# Patient Record
Sex: MALE | Race: WHITE | Employment: OTHER | URBAN - METROPOLITAN AREA
[De-identification: names, ages, dates, MRNs, and addresses within clinical notes are randomized per-mention and may not be internally consistent; named-entity substitution may affect disease eponyms.]

---

## 2017-09-12 PROBLEM — R25.2 SPASTICITY: Status: ACTIVE | Noted: 2017-09-12

## 2017-09-27 PROCEDURE — 88305 TISSUE EXAM BY PATHOLOGIST: CPT | Performed by: INTERNAL MEDICINE

## 2017-09-28 PROBLEM — R26.9 NEUROLOGIC GAIT DYSFUNCTION: Status: ACTIVE | Noted: 2017-09-28

## 2017-11-28 PROBLEM — M25.641 JOINT STIFFNESS OF HAND, RIGHT: Status: ACTIVE | Noted: 2017-11-28

## 2017-11-28 PROBLEM — M25.612 LIMITATION OF JOINT MOTION OF LEFT SHOULDER: Status: ACTIVE | Noted: 2017-11-28

## 2017-12-15 PROCEDURE — 86803 HEPATITIS C AB TEST: CPT | Performed by: INTERNAL MEDICINE

## 2019-06-05 PROBLEM — K65.1 PERITONEAL ABSCESS (HCC): Status: ACTIVE | Noted: 2019-06-05

## 2019-06-05 PROBLEM — N17.9 ACUTE RENAL FAILURE (HCC): Status: ACTIVE | Noted: 2019-06-05

## 2019-06-05 PROBLEM — D63.1 ANEMIA IN CHRONIC KIDNEY DISEASE: Status: ACTIVE | Noted: 2019-06-05

## 2019-06-05 PROBLEM — N13.9: Status: ACTIVE | Noted: 2019-06-05

## 2019-06-05 PROBLEM — E87.5 HYPERPOTASSEMIA: Status: ACTIVE | Noted: 2019-06-05

## 2019-06-05 PROBLEM — R57.9 SHOCK (HCC): Status: ACTIVE | Noted: 2019-06-05

## 2019-06-05 PROBLEM — N18.9 ANEMIA IN CHRONIC KIDNEY DISEASE: Status: ACTIVE | Noted: 2019-06-05

## 2019-06-05 PROBLEM — E87.0 HYPEROSMOLALITY AND/OR HYPERNATREMIA: Status: ACTIVE | Noted: 2019-06-05

## 2019-06-05 PROBLEM — I10 MALIGNANT ESSENTIAL HYPERTENSION: Status: ACTIVE | Noted: 2019-06-05

## 2019-09-27 ENCOUNTER — HOSPITAL ENCOUNTER (OUTPATIENT)
Dept: INTERVENTIONAL RADIOLOGY/VASCULAR | Facility: HOSPITAL | Age: 64
Discharge: HOME | End: 2019-09-27
Attending: INTERNAL MEDICINE | Admitting: INTERNAL MEDICINE
Payer: COMMERCIAL

## 2019-09-27 VITALS
HEIGHT: 78 IN | HEART RATE: 55 BPM | SYSTOLIC BLOOD PRESSURE: 129 MMHG | BODY MASS INDEX: 33.55 KG/M2 | WEIGHT: 290 LBS | OXYGEN SATURATION: 98 % | TEMPERATURE: 98 F | DIASTOLIC BLOOD PRESSURE: 74 MMHG | RESPIRATION RATE: 16 BRPM

## 2019-09-27 DIAGNOSIS — R93.1 ABNORMAL NUCLEAR CARDIAC IMAGING TEST: ICD-10-CM

## 2019-09-27 DIAGNOSIS — I25.10 CAD (CORONARY ARTERY DISEASE): ICD-10-CM

## 2019-09-27 PROCEDURE — 4A023N8 MEASUREMENT OF CARDIAC SAMPLING AND PRESSURE, BILATERAL, PERCUTANEOUS APPROACH: ICD-10-PCS | Performed by: INTERNAL MEDICINE

## 2019-09-27 PROCEDURE — 99152 MOD SED SAME PHYS/QHP 5/>YRS: CPT

## 2019-09-27 PROCEDURE — 99153 MOD SED SAME PHYS/QHP EA: CPT

## 2019-09-27 PROCEDURE — B2151ZZ FLUOROSCOPY OF LEFT HEART USING LOW OSMOLAR CONTRAST: ICD-10-PCS | Performed by: INTERNAL MEDICINE

## 2019-09-27 PROCEDURE — 85027 COMPLETE CBC AUTOMATED: CPT | Performed by: INTERNAL MEDICINE

## 2019-09-27 PROCEDURE — 80048 BASIC METABOLIC PNL TOTAL CA: CPT | Performed by: INTERNAL MEDICINE

## 2019-09-27 PROCEDURE — 93460 R&L HRT ART/VENTRICLE ANGIO: CPT

## 2019-09-27 PROCEDURE — B5191ZZ FLUOROSCOPY OF INFERIOR VENA CAVA USING LOW OSMOLAR CONTRAST: ICD-10-PCS | Performed by: INTERNAL MEDICINE

## 2019-09-27 PROCEDURE — 75825 VEIN X-RAY TRUNK: CPT

## 2019-09-27 PROCEDURE — B2111ZZ FLUOROSCOPY OF MULTIPLE CORONARY ARTERIES USING LOW OSMOLAR CONTRAST: ICD-10-PCS | Performed by: INTERNAL MEDICINE

## 2019-09-27 PROCEDURE — 36415 COLL VENOUS BLD VENIPUNCTURE: CPT

## 2019-09-27 RX ORDER — LIDOCAINE HYDROCHLORIDE 10 MG/ML
INJECTION, SOLUTION EPIDURAL; INFILTRATION; INTRACAUDAL; PERINEURAL
Status: COMPLETED
Start: 2019-09-27 | End: 2019-09-27

## 2019-09-27 RX ORDER — SODIUM CHLORIDE 9 MG/ML
INJECTION, SOLUTION INTRAVENOUS
Status: COMPLETED | OUTPATIENT
Start: 2019-09-28 | End: 2019-09-27

## 2019-09-27 RX ORDER — ASPIRIN 81 MG/1
324 TABLET, CHEWABLE ORAL ONCE
Status: COMPLETED | OUTPATIENT
Start: 2019-09-27 | End: 2019-09-27

## 2019-09-27 RX ORDER — VERAPAMIL HYDROCHLORIDE 2.5 MG/ML
INJECTION, SOLUTION INTRAVENOUS
Status: COMPLETED
Start: 2019-09-27 | End: 2019-09-27

## 2019-09-27 RX ORDER — MIDAZOLAM HYDROCHLORIDE 1 MG/ML
INJECTION INTRAMUSCULAR; INTRAVENOUS
Status: COMPLETED
Start: 2019-09-27 | End: 2019-09-27

## 2019-09-27 RX ORDER — ASPIRIN 81 MG/1
TABLET, CHEWABLE ORAL
Status: COMPLETED
Start: 2019-09-27 | End: 2019-09-27

## 2019-09-27 RX ADMIN — SODIUM CHLORIDE: 9 INJECTION, SOLUTION INTRAVENOUS at 09:06:00

## 2019-09-27 RX ADMIN — ASPIRIN 324 MG: 81 TABLET, CHEWABLE ORAL at 08:45:00

## 2019-09-27 NOTE — PROGRESS NOTES
Cath: 1. IVC filter is occluded with essentially occluded IVC. 2. Coronaries with mild CAD; nothing close to obstructive. 3. Normal LV size and EF. 3. Normal filling pressures. 5. Normal cardiac output. Suggest pulmonary consult. PCP.   SOFIA with oniel

## 2019-09-27 NOTE — PROGRESS NOTES
Rc'd pt from cath lab in stable condition. VSS. Manual dressing to right brachial vein and Perclose to right femoral artery are soft, clean and dry. No bleeding or hematoma. Pt denies c/o pain or discomfort. Dr Facundo Noriega at bedside along with wife.  Pt to be on

## 2019-09-27 NOTE — PROCEDURES
[address]    PATIENT'S NAME: Rosales GIBSON   ATTENDING PHYSICIAN: Leora Foreman. Gigi Austin MD   OPERATING PHYSICIAN: Leora Foreman.  Gigi Austin MD   PATIENT ACCOUNT#:   [id number]    LOCATION:  [facility] 1 [facility] 10  MEDICAL RECORD #:   [id number]       DATE OF BIRTH: Janina right for the right coronary artery. We used a Janina right to perform left heart catheterization and left ventriculography. We measured LVEDP and the pullback gradient.   After doing that, we then gained right brachial venous access utilizing th due to his filter which was placed for DVT and PE approximately 4 years ago. At this point, pulmonology consultation and follow up with his primary care physician for further direction regarding his dyspnea may be reasonable.   We would like to see him in

## 2019-09-27 NOTE — H&P
Cards    See EMR. BRAVO. Abnormal stress.     Past Medical History:   Diagnosis Date   • BPH (benign prostatic hyperplasia)    • Central cord syndrome (HCC)    • Cervical compression fracture (HCC)    • Other and unspecified hyperlipidemia    • Unspecif

## 2019-10-23 PROBLEM — N13.9: Status: RESOLVED | Noted: 2019-06-05 | Resolved: 2019-10-23

## 2019-12-19 PROBLEM — N17.9 ACUTE RENAL FAILURE (HCC): Status: RESOLVED | Noted: 2019-06-05 | Resolved: 2019-12-19

## 2019-12-19 PROBLEM — R57.9 SHOCK (HCC): Status: RESOLVED | Noted: 2019-06-05 | Resolved: 2019-12-19

## 2019-12-19 PROBLEM — I10 MALIGNANT ESSENTIAL HYPERTENSION: Status: RESOLVED | Noted: 2019-06-05 | Resolved: 2019-12-19

## 2020-01-20 NOTE — H&P (VIEW-ONLY)
Sonia Calzada is a 71year old male. Patient presents with:  Consult: Ref by Dr. Barbara Brown; mass of the appendix       Doc Lied Dy    HPI:  Patient presents after undergoing a CT scan of his abdomen and pelvis and being found to have a mass on his appendix.  This Yes      Alcohol/week: 2.5 standard drinks      Types: 3 Cans of beer per week      Comment: occasional beer    Drug use: No      Allergies:    Heparin                     Comment:Other reaction(s):  Other (See Comments)             Bad reaction (retroperit S2 normal, RRR; no S3, no S4; no click; murmur negative  ABDOMEN: normal active BS+, soft, nondistended; no HSM; no masses; no bruits; nontender  RECTAL: deferred  LYMPHATIC: no lymphadenopathy  MUSCULOSKELETAL: no acute synovitis upper or lower extremity,

## 2020-01-27 ENCOUNTER — APPOINTMENT (OUTPATIENT)
Dept: LAB | Age: 65
End: 2020-01-27
Payer: MEDICARE

## 2020-01-27 DIAGNOSIS — K38.8 MASS OF APPENDIX: ICD-10-CM

## 2020-01-27 DIAGNOSIS — I10 ESSENTIAL HYPERTENSION: ICD-10-CM

## 2020-01-27 DIAGNOSIS — N18.9 ANEMIA IN CHRONIC KIDNEY DISEASE: ICD-10-CM

## 2020-01-27 DIAGNOSIS — D63.1 ANEMIA IN CHRONIC KIDNEY DISEASE: ICD-10-CM

## 2020-01-27 LAB
ATRIAL RATE: 63 BPM
DEPRECATED RDW RBC AUTO: 41.6 FL (ref 35.1–46.3)
ERYTHROCYTE [DISTWIDTH] IN BLOOD BY AUTOMATED COUNT: 11.7 % (ref 11–15)
HCT VFR BLD AUTO: 52.9 % (ref 39–53)
HGB BLD-MCNC: 16.9 G/DL (ref 13–17.5)
MCH RBC QN AUTO: 30.8 PG (ref 26–34)
MCHC RBC AUTO-ENTMCNC: 31.9 G/DL (ref 31–37)
MCV RBC AUTO: 96.5 FL (ref 80–100)
P AXIS: 21 DEGREES
P-R INTERVAL: 206 MS
PLATELET # BLD AUTO: 177 10(3)UL (ref 150–450)
Q-T INTERVAL: 428 MS
QRS DURATION: 90 MS
QTC CALCULATION (BEZET): 437 MS
R AXIS: -18 DEGREES
RBC # BLD AUTO: 5.48 X10(6)UL (ref 3.8–5.8)
T AXIS: 53 DEGREES
VENTRICULAR RATE: 63 BPM
WBC # BLD AUTO: 7.8 X10(3) UL (ref 4–11)

## 2020-01-27 PROCEDURE — 85027 COMPLETE CBC AUTOMATED: CPT

## 2020-01-27 PROCEDURE — 36415 COLL VENOUS BLD VENIPUNCTURE: CPT

## 2020-01-27 PROCEDURE — 93010 ELECTROCARDIOGRAM REPORT: CPT | Performed by: INTERNAL MEDICINE

## 2020-01-27 PROCEDURE — 93005 ELECTROCARDIOGRAM TRACING: CPT

## 2020-02-05 ENCOUNTER — ANESTHESIA EVENT (OUTPATIENT)
Dept: SURGERY | Facility: HOSPITAL | Age: 65
End: 2020-02-05
Payer: MEDICARE

## 2020-02-05 ENCOUNTER — ANESTHESIA (OUTPATIENT)
Dept: SURGERY | Facility: HOSPITAL | Age: 65
End: 2020-02-05
Payer: MEDICARE

## 2020-02-05 ENCOUNTER — HOSPITAL ENCOUNTER (OUTPATIENT)
Facility: HOSPITAL | Age: 65
Setting detail: HOSPITAL OUTPATIENT SURGERY
Discharge: HOME | End: 2020-02-05
Attending: SURGERY | Admitting: SURGERY
Payer: MEDICARE

## 2020-02-05 VITALS
HEART RATE: 56 BPM | RESPIRATION RATE: 18 BRPM | OXYGEN SATURATION: 98 % | HEIGHT: 78 IN | BODY MASS INDEX: 34.18 KG/M2 | DIASTOLIC BLOOD PRESSURE: 62 MMHG | TEMPERATURE: 98 F | SYSTOLIC BLOOD PRESSURE: 143 MMHG | WEIGHT: 295.44 LBS

## 2020-02-05 DIAGNOSIS — K38.8 MASS OF APPENDIX: ICD-10-CM

## 2020-02-05 DIAGNOSIS — D63.1 ANEMIA IN CHRONIC KIDNEY DISEASE: ICD-10-CM

## 2020-02-05 DIAGNOSIS — N18.9 ANEMIA IN CHRONIC KIDNEY DISEASE: ICD-10-CM

## 2020-02-05 DIAGNOSIS — I10 ESSENTIAL HYPERTENSION: Primary | ICD-10-CM

## 2020-02-05 PROCEDURE — 0DTJ4ZZ RESECTION OF APPENDIX, PERCUTANEOUS ENDOSCOPIC APPROACH: ICD-10-PCS | Performed by: SURGERY

## 2020-02-05 PROCEDURE — 0WQF0ZZ REPAIR ABDOMINAL WALL, OPEN APPROACH: ICD-10-PCS | Performed by: SURGERY

## 2020-02-05 PROCEDURE — 88304 TISSUE EXAM BY PATHOLOGIST: CPT | Performed by: SURGERY

## 2020-02-05 RX ORDER — KETOROLAC TROMETHAMINE 30 MG/ML
INJECTION, SOLUTION INTRAMUSCULAR; INTRAVENOUS AS NEEDED
Status: DISCONTINUED | OUTPATIENT
Start: 2020-02-05 | End: 2020-02-05 | Stop reason: SURG

## 2020-02-05 RX ORDER — DEXAMETHASONE SODIUM PHOSPHATE 4 MG/ML
VIAL (ML) INJECTION AS NEEDED
Status: DISCONTINUED | OUTPATIENT
Start: 2020-02-05 | End: 2020-02-05 | Stop reason: SURG

## 2020-02-05 RX ORDER — SODIUM CHLORIDE, SODIUM LACTATE, POTASSIUM CHLORIDE, CALCIUM CHLORIDE 600; 310; 30; 20 MG/100ML; MG/100ML; MG/100ML; MG/100ML
INJECTION, SOLUTION INTRAVENOUS CONTINUOUS
Status: DISCONTINUED | OUTPATIENT
Start: 2020-02-05 | End: 2020-02-05

## 2020-02-05 RX ORDER — LIDOCAINE HYDROCHLORIDE 10 MG/ML
INJECTION, SOLUTION INFILTRATION; PERINEURAL AS NEEDED
Status: DISCONTINUED | OUTPATIENT
Start: 2020-02-05 | End: 2020-02-05 | Stop reason: HOSPADM

## 2020-02-05 RX ORDER — METOCLOPRAMIDE HYDROCHLORIDE 5 MG/ML
10 INJECTION INTRAMUSCULAR; INTRAVENOUS AS NEEDED
Status: DISCONTINUED | OUTPATIENT
Start: 2020-02-05 | End: 2020-02-05

## 2020-02-05 RX ORDER — BUPIVACAINE HYDROCHLORIDE 5 MG/ML
INJECTION, SOLUTION EPIDURAL; INTRACAUDAL AS NEEDED
Status: DISCONTINUED | OUTPATIENT
Start: 2020-02-05 | End: 2020-02-05 | Stop reason: HOSPADM

## 2020-02-05 RX ORDER — HYDROCODONE BITARTRATE AND ACETAMINOPHEN 10; 325 MG/1; MG/1
2 TABLET ORAL AS NEEDED
Status: COMPLETED | OUTPATIENT
Start: 2020-02-05 | End: 2020-02-05

## 2020-02-05 RX ORDER — NALOXONE HYDROCHLORIDE 0.4 MG/ML
80 INJECTION, SOLUTION INTRAMUSCULAR; INTRAVENOUS; SUBCUTANEOUS AS NEEDED
Status: DISCONTINUED | OUTPATIENT
Start: 2020-02-05 | End: 2020-02-05

## 2020-02-05 RX ORDER — HYDROMORPHONE HYDROCHLORIDE 1 MG/ML
INJECTION, SOLUTION INTRAMUSCULAR; INTRAVENOUS; SUBCUTANEOUS
Status: COMPLETED
Start: 2020-02-05 | End: 2020-02-05

## 2020-02-05 RX ORDER — ONDANSETRON 2 MG/ML
INJECTION INTRAMUSCULAR; INTRAVENOUS AS NEEDED
Status: DISCONTINUED | OUTPATIENT
Start: 2020-02-05 | End: 2020-02-05 | Stop reason: SURG

## 2020-02-05 RX ORDER — PHENYLEPHRINE HCL 10 MG/ML
VIAL (ML) INJECTION AS NEEDED
Status: DISCONTINUED | OUTPATIENT
Start: 2020-02-05 | End: 2020-02-05 | Stop reason: SURG

## 2020-02-05 RX ORDER — ACETAMINOPHEN 500 MG
1000 TABLET ORAL ONCE AS NEEDED
Status: DISCONTINUED | OUTPATIENT
Start: 2020-02-05 | End: 2020-02-05

## 2020-02-05 RX ORDER — GLYCOPYRROLATE 0.2 MG/ML
INJECTION, SOLUTION INTRAMUSCULAR; INTRAVENOUS AS NEEDED
Status: DISCONTINUED | OUTPATIENT
Start: 2020-02-05 | End: 2020-02-05 | Stop reason: SURG

## 2020-02-05 RX ORDER — DEXAMETHASONE SODIUM PHOSPHATE 4 MG/ML
4 VIAL (ML) INJECTION AS NEEDED
Status: DISCONTINUED | OUTPATIENT
Start: 2020-02-05 | End: 2020-02-05

## 2020-02-05 RX ORDER — ACETAMINOPHEN 500 MG
1000 TABLET ORAL ONCE
Status: DISCONTINUED | OUTPATIENT
Start: 2020-02-05 | End: 2020-02-05 | Stop reason: HOSPADM

## 2020-02-05 RX ORDER — ONDANSETRON 2 MG/ML
4 INJECTION INTRAMUSCULAR; INTRAVENOUS AS NEEDED
Status: DISCONTINUED | OUTPATIENT
Start: 2020-02-05 | End: 2020-02-05

## 2020-02-05 RX ORDER — ROCURONIUM BROMIDE 10 MG/ML
INJECTION, SOLUTION INTRAVENOUS AS NEEDED
Status: DISCONTINUED | OUTPATIENT
Start: 2020-02-05 | End: 2020-02-05 | Stop reason: SURG

## 2020-02-05 RX ORDER — LABETALOL HYDROCHLORIDE 5 MG/ML
5 INJECTION, SOLUTION INTRAVENOUS EVERY 5 MIN PRN
Status: DISCONTINUED | OUTPATIENT
Start: 2020-02-05 | End: 2020-02-05

## 2020-02-05 RX ORDER — HYDROCODONE BITARTRATE AND ACETAMINOPHEN 10; 325 MG/1; MG/1
1 TABLET ORAL AS NEEDED
Status: COMPLETED | OUTPATIENT
Start: 2020-02-05 | End: 2020-02-05

## 2020-02-05 RX ORDER — DIPHENHYDRAMINE HYDROCHLORIDE 50 MG/ML
12.5 INJECTION INTRAMUSCULAR; INTRAVENOUS AS NEEDED
Status: DISCONTINUED | OUTPATIENT
Start: 2020-02-05 | End: 2020-02-05

## 2020-02-05 RX ORDER — MIDAZOLAM HYDROCHLORIDE 1 MG/ML
1 INJECTION INTRAMUSCULAR; INTRAVENOUS EVERY 5 MIN PRN
Status: DISCONTINUED | OUTPATIENT
Start: 2020-02-05 | End: 2020-02-05

## 2020-02-05 RX ORDER — NEOSTIGMINE METHYLSULFATE 1 MG/ML
INJECTION INTRAVENOUS AS NEEDED
Status: DISCONTINUED | OUTPATIENT
Start: 2020-02-05 | End: 2020-02-05 | Stop reason: SURG

## 2020-02-05 RX ORDER — HYDROCODONE BITARTRATE AND ACETAMINOPHEN 5; 325 MG/1; MG/1
1-2 TABLET ORAL
Qty: 24 TABLET | Refills: 0 | Status: SHIPPED | OUTPATIENT
Start: 2020-02-05 | End: 2020-02-15

## 2020-02-05 RX ORDER — HYDROMORPHONE HYDROCHLORIDE 1 MG/ML
0.4 INJECTION, SOLUTION INTRAMUSCULAR; INTRAVENOUS; SUBCUTANEOUS EVERY 5 MIN PRN
Status: DISCONTINUED | OUTPATIENT
Start: 2020-02-05 | End: 2020-02-05

## 2020-02-05 RX ORDER — MEPERIDINE HYDROCHLORIDE 25 MG/ML
12.5 INJECTION INTRAMUSCULAR; INTRAVENOUS; SUBCUTANEOUS AS NEEDED
Status: DISCONTINUED | OUTPATIENT
Start: 2020-02-05 | End: 2020-02-05

## 2020-02-05 RX ADMIN — ONDANSETRON 4 MG: 2 INJECTION INTRAMUSCULAR; INTRAVENOUS at 16:50:00

## 2020-02-05 RX ADMIN — PHENYLEPHRINE HCL 100 MCG: 10 MG/ML VIAL (ML) INJECTION at 16:23:00

## 2020-02-05 RX ADMIN — GLYCOPYRROLATE 1 MG: 0.2 INJECTION, SOLUTION INTRAMUSCULAR; INTRAVENOUS at 17:01:00

## 2020-02-05 RX ADMIN — SODIUM CHLORIDE, SODIUM LACTATE, POTASSIUM CHLORIDE, CALCIUM CHLORIDE: 600; 310; 30; 20 INJECTION, SOLUTION INTRAVENOUS at 16:01:00

## 2020-02-05 RX ADMIN — DEXAMETHASONE SODIUM PHOSPHATE 4 MG: 4 MG/ML VIAL (ML) INJECTION at 16:35:00

## 2020-02-05 RX ADMIN — KETOROLAC TROMETHAMINE 30 MG: 30 INJECTION, SOLUTION INTRAMUSCULAR; INTRAVENOUS at 16:57:00

## 2020-02-05 RX ADMIN — NEOSTIGMINE METHYLSULFATE 5 MG: 1 INJECTION INTRAVENOUS at 17:02:00

## 2020-02-05 RX ADMIN — ROCURONIUM BROMIDE 70 MG: 10 INJECTION, SOLUTION INTRAVENOUS at 16:08:00

## 2020-02-05 RX ADMIN — PHENYLEPHRINE HCL 100 MCG: 10 MG/ML VIAL (ML) INJECTION at 16:29:00

## 2020-02-05 NOTE — ANESTHESIA POSTPROCEDURE EVALUATION
[address] [facility] Patient Status:  Hospital Outpatient Surgery   Age/Gender 71year old male MRN [id number]   [facility] SURGERY Attending Sharon Wilcox MD   Hosp Day # 0 PCP Lupe Rueda MD       Anesthesia Post-op Note    P

## 2020-02-05 NOTE — INTERVAL H&P NOTE
Pre-op Diagnosis: Mass of appendix [K38.8]    The above referenced H&P was reviewed by Mickey Medina MD on 2/5/2020, the patient was examined and no significant changes have occurred in the patient's condition since the H&P was performed.   I discussed wit

## 2020-02-05 NOTE — ANESTHESIA PREPROCEDURE EVALUATION
PRE-OP EVALUATION    Patient Name: Arnulfo Morillo    Pre-op Diagnosis: Mass of appendix [K38.8]    Procedure(s):  LAPAROSCOPIC APPENDECTOMY, POSSIBLE OPEN; UMBILICAL HERNIA REPAIR      Surgeon(s) and Role:     Stephen Quiroga MD - Primary    Pre-op sonja Cardiovascular        Exercise tolerance: good     MET: >4    (+) obesity  (+) hypertension                                     Endo/Other    Negative endo/other ROS. Pulmonary    Negative pulmonary ROS. Airway      Mallampati: II  Mouth opening: 3 FB  TM distance: 4 - 6 cm  Neck ROM: limited Cardiovascular    Cardiovascular exam normal.         Dental    No notable dental history.          Pulmonary    Pulmonary exam normal.                 Other fin

## 2020-02-05 NOTE — INTERVAL H&P NOTE
Pre-op Diagnosis: Mass of appendix [K38.8]    The above referenced H&P was reviewed by Cleo Dumont MD on 2/5/2020, the patient was examined and no significant changes have occurred in the patient's condition since the H&P was performed.   I discussed wit

## 2020-02-05 NOTE — ANESTHESIA PROCEDURE NOTES
Airway  Date/Time: 2/5/2020 4:10 PM  Urgency: elective    Airway not difficult    General Information and Staff    Patient location during procedure: OR  Anesthesiologist: Daniel Mcelroy MD  Performed: anesthesiologist     Indications and Patient Con

## 2020-02-05 NOTE — BRIEF OP NOTE
Pre-Operative Diagnosis: Mass of appendix [K38.8]     Post-Operative Diagnosis: Mass of appendix [K38.8]      Procedure Performed:   Procedure(s):  LAPAROSCOPIC APPENDECTOMY UMBILICAL HERNIA REPAIR      Surgeon(s) and Role:     MD Sharmaine Messer

## 2020-02-06 NOTE — OPERATIVE REPORT
[facility]                                                         Operative Note    Weston Null Location: [place] [address] Perioperative   CSN [id number] MRN [id number]   Admission Date 2/5/2020 Procedure Date 2/6/2020   Attending Physician No att. providers fo abdominal cavity. The base of the appendix appeared to be of normal diameter. Hemostasis achieved. The abdomen was irrigated. Trochars were removed. Fascia the umbilicus was closed with interrupted 0 Ethibond sutures.   Wounds were closed absorbable julien

## 2020-02-19 PROBLEM — D37.3 LOW GRADE MUCINOUS NEOPLASM OF APPENDIX: Status: ACTIVE | Noted: 2020-02-19

## 2020-02-19 PROBLEM — D37.3 APPENDICEAL TUMOR: Status: ACTIVE | Noted: 2020-02-19

## 2020-02-19 PROBLEM — F41.8 ANXIETY ABOUT HEALTH: Status: ACTIVE | Noted: 2020-02-19

## 2020-07-10 PROCEDURE — 88305 TISSUE EXAM BY PATHOLOGIST: CPT | Performed by: INTERNAL MEDICINE

## 2021-04-29 ENCOUNTER — APPOINTMENT (OUTPATIENT)
Dept: GENERAL RADIOLOGY | Facility: HOSPITAL | Age: 66
DRG: 390 | End: 2021-04-29
Attending: SURGERY
Payer: MEDICARE

## 2021-04-29 ENCOUNTER — HOSPITAL ENCOUNTER (INPATIENT)
Facility: HOSPITAL | Age: 66
LOS: 3 days | Discharge: HOME | DRG: 390 | End: 2021-05-02
Attending: HOSPITALIST | Admitting: HOSPITALIST
Payer: MEDICARE

## 2021-04-29 PROCEDURE — C9113 INJ PANTOPRAZOLE SODIUM, VIA: HCPCS | Performed by: HOSPITALIST

## 2021-04-29 PROCEDURE — 71045 X-RAY EXAM CHEST 1 VIEW: CPT | Performed by: SURGERY

## 2021-04-29 RX ORDER — SODIUM CHLORIDE 9 MG/ML
INJECTION, SOLUTION INTRAVENOUS CONTINUOUS
Status: DISCONTINUED | OUTPATIENT
Start: 2021-04-29 | End: 2021-05-02

## 2021-04-29 RX ORDER — MORPHINE SULFATE 2 MG/ML
1 INJECTION, SOLUTION INTRAMUSCULAR; INTRAVENOUS EVERY 2 HOUR PRN
Status: DISCONTINUED | OUTPATIENT
Start: 2021-04-29 | End: 2021-05-02

## 2021-04-29 RX ORDER — HYDRALAZINE HYDROCHLORIDE 20 MG/ML
10 INJECTION INTRAMUSCULAR; INTRAVENOUS EVERY 6 HOURS PRN
Status: DISCONTINUED | OUTPATIENT
Start: 2021-04-29 | End: 2021-05-02

## 2021-04-29 RX ORDER — SALIVA STIMULANT COMB. NO.3
SPRAY, NON-AEROSOL (ML) MUCOUS MEMBRANE AS NEEDED
Status: DISCONTINUED | OUTPATIENT
Start: 2021-04-29 | End: 2021-05-02

## 2021-04-29 RX ORDER — MORPHINE SULFATE 2 MG/ML
2 INJECTION, SOLUTION INTRAMUSCULAR; INTRAVENOUS EVERY 2 HOUR PRN
Status: DISCONTINUED | OUTPATIENT
Start: 2021-04-29 | End: 2021-05-02

## 2021-04-29 RX ORDER — LORAZEPAM 2 MG/ML
0.5 INJECTION INTRAMUSCULAR ONCE
Status: COMPLETED | OUTPATIENT
Start: 2021-04-29 | End: 2021-04-29

## 2021-04-29 RX ORDER — HYDRALAZINE HYDROCHLORIDE 20 MG/ML
10 INJECTION INTRAMUSCULAR; INTRAVENOUS ONCE
Status: DISCONTINUED | OUTPATIENT
Start: 2021-04-29 | End: 2021-04-29

## 2021-04-29 RX ORDER — ONDANSETRON 2 MG/ML
4 INJECTION INTRAMUSCULAR; INTRAVENOUS EVERY 6 HOURS PRN
Status: DISCONTINUED | OUTPATIENT
Start: 2021-04-29 | End: 2021-05-02

## 2021-04-29 RX ORDER — MORPHINE SULFATE 4 MG/ML
4 INJECTION, SOLUTION INTRAMUSCULAR; INTRAVENOUS EVERY 2 HOUR PRN
Status: DISCONTINUED | OUTPATIENT
Start: 2021-04-29 | End: 2021-05-02

## 2021-04-30 PROCEDURE — C9113 INJ PANTOPRAZOLE SODIUM, VIA: HCPCS | Performed by: HOSPITALIST

## 2021-04-30 RX ORDER — FLUTICASONE PROPIONATE 50 MCG
1 SPRAY, SUSPENSION (ML) NASAL DAILY
Status: DISCONTINUED | OUTPATIENT
Start: 2021-04-30 | End: 2021-05-02

## 2021-04-30 RX ORDER — KETOROLAC TROMETHAMINE 15 MG/ML
15 INJECTION, SOLUTION INTRAMUSCULAR; INTRAVENOUS EVERY 6 HOURS PRN
Status: DISPENSED | OUTPATIENT
Start: 2021-04-30 | End: 2021-05-02

## 2021-04-30 RX ORDER — TAMSULOSIN HYDROCHLORIDE 0.4 MG/1
0.8 CAPSULE ORAL EVERY EVENING
Status: DISCONTINUED | OUTPATIENT
Start: 2021-04-30 | End: 2021-05-02

## 2021-04-30 RX ORDER — ACETAMINOPHEN 10 MG/ML
1000 INJECTION, SOLUTION INTRAVENOUS EVERY 6 HOURS PRN
Status: DISCONTINUED | OUTPATIENT
Start: 2021-04-30 | End: 2021-05-02

## 2021-04-30 RX ORDER — METOPROLOL TARTRATE 5 MG/5ML
5 INJECTION INTRAVENOUS EVERY 6 HOURS
Status: DISCONTINUED | OUTPATIENT
Start: 2021-04-30 | End: 2021-05-02

## 2021-04-30 NOTE — PROGRESS NOTES
[facility] Pharmacy Note:  Age Based Dose Adjustment    Rigo Henao has been prescribed ketorolac (TORADOL) 30 mg IV every 6 hours pen pain. Patient is >71 years old therefore the dose has been adjusted to 15 mg IV every 6 hours prn pain.       Thank you,  Ricardo Mott

## 2021-04-30 NOTE — PLAN OF CARE
A&Ox4. VSS. RA. . GI: Abdomen soft, nondistended, rounded. Denies passing gas. Denies nausea. : DTV. Pain controlled with PRN pain medications  Up with standby assist.  Drains: NGT in place to LSI draining bile colored drainage.  Cxray confirmed pl Consider OT/PT consult to assist with strengthening/mobility  - Encourage toileting schedule  Outcome: Progressing     Problem: DISCHARGE PLANNING  Goal: Discharge to home or other facility with appropriate resources  Description: INTERVENTIONS:  - Identif

## 2021-04-30 NOTE — CONSULTS
[facility]  Report of Consultation    Yasir Hale Patient Status:  Inpatient    10/7/1955 MRN [id number]   [facility] 3NW-A Attending Miki Martin MD   [address] Day # 1 PCP Adina Delgado MD     21    Reason for Consultation:    [place] gastric bx (-) hpylori       Family History:    Family History   Problem Relation Age of Onset   • Heart Attack Father    • Hypertension Mother    • Diabetes Mother    • Hypertension Brother        Social History:     reports that he has never smoked.  He h daily., Disp: 90 tablet, Rfl: 2  tamsulosin (FLOMAX) cap, Take 2 capsules (0.8 mg total) by mouth every evening., Disp: 180 capsule, Rfl: 2  Metoprolol Succinate ER 25 MG Oral Tablet 24 Hr, Take 1 tablet (25 mg total) by mouth 2 (two) times daily. , Disp: 1 pelvis 6/10/2020. TECHNIQUE:  Axial images of the abdomen and pelvis were performed from the lung bases through the pubic symphysis after the injection of nonionic intravenous contrast.  Oral contrast was not used for the examination.   80 mL of Isovue 370 radiograph was obtained.   COMPARISON:  [facility] , XR, XR CHEST AP PORTABLE  (CPT=71010), 8/11/2015, 2:38 PM.  INDICATIONS:  Verify correct tube placement  PATIENT STATED HISTORY: (As transcribed by Technologist)  Patient offered no additional history at this

## 2021-04-30 NOTE — PROGRESS NOTES
NURSING ADMISSION NOTE      Patient admitted via Wheelchair  Oriented to room. Safety precautions initiated. Bed in low position. Call light in reach. A&O x4. Denies any CP, BRENDAN, or calf pain at present. VSS and afebrile. Lungs clear bilaterally.  A

## 2021-04-30 NOTE — PLAN OF CARE
A&O x4. Denies any CP, BRENDAN, or calf pain at present. Lungs clear bilaterally. Abdomen soft, tender, rounded. Bowel sounds hypoactive - pt reports passing gas. Denies any nausea at this time. NPO. Voiding without difficulty.  Pt reports headache but declines reduce risk of injury  - Provide assistive devices as appropriate  - Consider OT/PT consult to assist with strengthening/mobility  - Encourage toileting schedule  Outcome: Progressing     Problem: DISCHARGE PLANNING  Goal: Discharge to home or other facili

## 2021-04-30 NOTE — H&P
[facility] Hospitalist H&P       CC: No chief complaint on file.        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 , Disp: 90 tablet, Rfl: 2  tamsulosin (FLOMAX) cap, Take 2 capsules (0.8 mg total) by mouth every evening., Disp: 180 capsule, Rfl: 2  Metoprolol Succinate ER 25 MG Oral Tablet 24 Hr, Take 1 tablet (25 mg tot rashes/lesions  Psych: normal mood/affect      Diagnostic Data:    CBC/Chem  Recent Labs   Lab 04/29/21  1345   WBC 11.52   HGB 18.0*   MCV 93.2      NE 9.81*   LYMABS 0.77*       Recent Labs   Lab 04/29/21  1345      K 4.43      CO2 22. concerning for small bowel obstruction with transition in the distal bowel loops in the right side of the abdomen. [place] PERITONEUM: No free fluid. ABDOMINAL WALL: There are small bilateral inguinal hernias containing fat.  OSSEOUS STRUCTURES: There are degenera of being without chemical DVT prophy (DVT, PE). Pt aware of risks and has decided to just use SCDs and ambulation at this time. Prophy: SCDs only at this time.  Readdress if pt will be NPO for prolonged period of time due to h/o DVT    Dispo: admit    L

## 2021-05-01 ENCOUNTER — APPOINTMENT (OUTPATIENT)
Dept: GENERAL RADIOLOGY | Facility: HOSPITAL | Age: 66
DRG: 390 | End: 2021-05-01
Attending: PHYSICIAN ASSISTANT
Payer: MEDICARE

## 2021-05-01 PROCEDURE — 81003 URINALYSIS AUTO W/O SCOPE: CPT | Performed by: HOSPITALIST

## 2021-05-01 PROCEDURE — C9113 INJ PANTOPRAZOLE SODIUM, VIA: HCPCS | Performed by: HOSPITALIST

## 2021-05-01 PROCEDURE — 80048 BASIC METABOLIC PNL TOTAL CA: CPT | Performed by: HOSPITALIST

## 2021-05-01 PROCEDURE — 74019 RADEX ABDOMEN 2 VIEWS: CPT | Performed by: PHYSICIAN ASSISTANT

## 2021-05-01 PROCEDURE — 85025 COMPLETE CBC W/AUTO DIFF WBC: CPT | Performed by: HOSPITALIST

## 2021-05-01 RX ORDER — SODIUM CHLORIDE 9 MG/ML
INJECTION, SOLUTION INTRAVENOUS CONTINUOUS
Status: ACTIVE | OUTPATIENT
Start: 2021-05-01 | End: 2021-05-01

## 2021-05-01 NOTE — PLAN OF CARE
A&Ox4. VSS. RA. . GI: Abdomen soft, nondistended, rounded. Denies passing gas. Denies nausea. : Voids. Low urine output. Urine very dark. 500ml bolus x2. Bladder scan after 2nd bolus and after voiding. <150mL on both bladder scans.   Pain well contr based on assessment  - Modify environment to reduce risk of injury  - Provide assistive devices as appropriate  - Consider OT/PT consult to assist with strengthening/mobility  - Encourage toileting schedule  Outcome: Progressing     Problem: DISCHARGE PLAN

## 2021-05-01 NOTE — PROGRESS NOTES
[facility]  Progress Note    Richard Duenas Patient Status:  Inpatient    10/7/1955 MRN [id number]   [facility] 3NW-A Attending Bhargav Eaton MD   Hosp Day # 2 PCP Haroon Machado MD     Subjective:  Patient is sitting up without

## 2021-05-01 NOTE — PROGRESS NOTES
[facility]  Progress Note    [address] [facility] Patient Status:  Inpatient    10/7/1955 MRN [id number]   [facility] 3NW-A Attending Zari Nur MD   Hosp Day # 2 PCP Dimple Bonilla MD     Subjective:    Denies abdominal pain.   Pass as above     Dispo: admit

## 2021-05-01 NOTE — PLAN OF CARE
Denies any pain. Denies nausea. Feels like stomach is \"gurgling\" and he is passing gas. NG clamped at 0930 per general surgery. Started on clear liquid diet. Will discontinue NG tube if tolerates for 6 hours. Going down for obstructive series also.  Alyssa Beatty

## 2021-05-02 VITALS
SYSTOLIC BLOOD PRESSURE: 136 MMHG | HEART RATE: 75 BPM | DIASTOLIC BLOOD PRESSURE: 70 MMHG | BODY MASS INDEX: 34 KG/M2 | OXYGEN SATURATION: 100 % | RESPIRATION RATE: 18 BRPM | TEMPERATURE: 98 F | WEIGHT: 295 LBS

## 2021-05-02 PROCEDURE — C9113 INJ PANTOPRAZOLE SODIUM, VIA: HCPCS | Performed by: HOSPITALIST

## 2021-05-02 PROCEDURE — 84132 ASSAY OF SERUM POTASSIUM: CPT | Performed by: HOSPITALIST

## 2021-05-02 RX ORDER — PANTOPRAZOLE SODIUM 40 MG/1
40 TABLET, DELAYED RELEASE ORAL
Status: DISCONTINUED | OUTPATIENT
Start: 2021-05-02 | End: 2021-05-02

## 2021-05-02 RX ORDER — FUROSEMIDE 20 MG/1
20 TABLET ORAL DAILY
Status: DISCONTINUED | OUTPATIENT
Start: 2021-05-02 | End: 2021-05-02

## 2021-05-02 RX ORDER — ASPIRIN 81 MG/1
81 TABLET ORAL DAILY
Status: DISCONTINUED | OUTPATIENT
Start: 2021-05-02 | End: 2021-05-02

## 2021-05-02 RX ORDER — METOPROLOL SUCCINATE 25 MG/1
25 TABLET, EXTENDED RELEASE ORAL
Status: DISCONTINUED | OUTPATIENT
Start: 2021-05-02 | End: 2021-05-02

## 2021-05-02 RX ORDER — POTASSIUM CHLORIDE 20 MEQ/1
40 TABLET, EXTENDED RELEASE ORAL ONCE
Status: COMPLETED | OUTPATIENT
Start: 2021-05-02 | End: 2021-05-02

## 2021-05-02 NOTE — PLAN OF CARE
Pt states he feels much better tonight, appetite is increasing, tolerated clear liquids for lunch and dinner, is passing flatus, no nausea, no abdominal pain. Pt urine output is improving, is back on flomax. Denies any difficulty urinating.  Pt has history INTERVENTIONS:  - Assess pt frequently for physical needs  - Identify cognitive and physical deficits and behaviors that affect risk of falls.   - [place] fall precautions as indicated by assessment.  - Educate pt/family on patient safety including physic

## 2021-05-02 NOTE — PROGRESS NOTES
The patient is tolerating clear liquids. He denies any nausea and states that he is passing flatus. No residual gastric fluid obtained from nasogastric tube after completion of clamping trial. Nasogastric tube removed per MD order.

## 2021-05-02 NOTE — PLAN OF CARE
Patient ambulating halls. VSS. Tolerated breakfast with no abdominal pain/nausea. Will order lunch. Abdomen rounded, BS+, passing flatus. POC discussed with patient, all questions and concerns addressed. Will continue to monitor.

## 2021-05-02 NOTE — PLAN OF CARE
Patient tolerated lunch. Had small BM. Ready for discharge. Patients IV d/c'd, catheter intact. All discharge instructions explained, all questions answered. Patient declined wheelchair, will ambulate to elevator with spouse.

## 2021-05-02 NOTE — PROGRESS NOTES
[facility]  Progress Note    Tiff Duenas Patient Status:  Inpatient    10/7/1955 MRN [id number]   [facility] 3NW-A Attending Beata Altamirano MD   Hosp Day # 3 PCP Melany Daniels MD     Subjective:  Patient is feeling well overal

## 2021-05-02 NOTE — DISCHARGE SUMMARY
General Medicine Discharge Summary     Patient ID:  Herman Driver  76year old  10/7/1955    Admit date: 4/29/2021    Discharge date and time: 5/2/2021    Attending Physician: Amilcar Lopez MD 1 TO 2 WEEKS AS NEEDED FLARES    FLUTICASONE PROPIONATE 50 MCG/ACT Nasal Suspension  USE 1 SPRAY NASALLY DAILY    Acetaminophen 650 MG Oral Tab  Take 1-2 tablets by mouth 2 (two) times daily as needed.             I PERSONALLY RECONCILED CURRENT AND DISCHAR

## 2021-05-03 ENCOUNTER — APPOINTMENT (OUTPATIENT)
Dept: CT IMAGING | Facility: HOSPITAL | Age: 66
DRG: 392 | End: 2021-05-03
Attending: PHYSICIAN ASSISTANT
Payer: MEDICARE

## 2021-05-03 ENCOUNTER — HOSPITAL ENCOUNTER (INPATIENT)
Facility: HOSPITAL | Age: 66
LOS: 4 days | Discharge: HOME | DRG: 392 | End: 2021-05-07
Attending: EMERGENCY MEDICINE | Admitting: INTERNAL MEDICINE
Payer: MEDICARE

## 2021-05-03 ENCOUNTER — APPOINTMENT (OUTPATIENT)
Dept: GENERAL RADIOLOGY | Facility: HOSPITAL | Age: 66
DRG: 392 | End: 2021-05-03
Attending: PHYSICIAN ASSISTANT
Payer: MEDICARE

## 2021-05-03 DIAGNOSIS — R19.7 DIARRHEA, UNSPECIFIED TYPE: ICD-10-CM

## 2021-05-03 DIAGNOSIS — I10 ESSENTIAL HYPERTENSION: ICD-10-CM

## 2021-05-03 DIAGNOSIS — R10.9 ABDOMINAL PAIN, ACUTE: Primary | ICD-10-CM

## 2021-05-03 PROBLEM — E87.2 METABOLIC ACIDOSIS: Status: ACTIVE | Noted: 2021-05-03

## 2021-05-03 PROBLEM — R73.9 HYPERGLYCEMIA: Status: ACTIVE | Noted: 2021-05-03

## 2021-05-03 PROBLEM — E87.6 HYPOKALEMIA: Status: ACTIVE | Noted: 2021-05-03

## 2021-05-03 PROCEDURE — 85025 COMPLETE CBC W/AUTO DIFF WBC: CPT

## 2021-05-03 PROCEDURE — 87493 C DIFF AMPLIFIED PROBE: CPT | Performed by: HOSPITALIST

## 2021-05-03 PROCEDURE — 80053 COMPREHEN METABOLIC PANEL: CPT | Performed by: EMERGENCY MEDICINE

## 2021-05-03 PROCEDURE — 96361 HYDRATE IV INFUSION ADD-ON: CPT

## 2021-05-03 PROCEDURE — 87507 IADNA-DNA/RNA PROBE TQ 12-25: CPT | Performed by: HOSPITALIST

## 2021-05-03 PROCEDURE — 74019 RADEX ABDOMEN 2 VIEWS: CPT | Performed by: PHYSICIAN ASSISTANT

## 2021-05-03 PROCEDURE — 99285 EMERGENCY DEPT VISIT HI MDM: CPT

## 2021-05-03 PROCEDURE — 85025 COMPLETE CBC W/AUTO DIFF WBC: CPT | Performed by: EMERGENCY MEDICINE

## 2021-05-03 PROCEDURE — 83690 ASSAY OF LIPASE: CPT

## 2021-05-03 PROCEDURE — 83690 ASSAY OF LIPASE: CPT | Performed by: EMERGENCY MEDICINE

## 2021-05-03 PROCEDURE — 74177 CT ABD & PELVIS W/CONTRAST: CPT | Performed by: PHYSICIAN ASSISTANT

## 2021-05-03 PROCEDURE — 96360 HYDRATION IV INFUSION INIT: CPT

## 2021-05-03 PROCEDURE — 80053 COMPREHEN METABOLIC PANEL: CPT

## 2021-05-03 RX ORDER — SODIUM CHLORIDE 9 MG/ML
INJECTION, SOLUTION INTRAVENOUS CONTINUOUS
Status: ACTIVE | OUTPATIENT
Start: 2021-05-03 | End: 2021-05-04

## 2021-05-03 RX ORDER — ASPIRIN 81 MG/1
81 TABLET ORAL DAILY
Status: DISCONTINUED | OUTPATIENT
Start: 2021-05-04 | End: 2021-05-07

## 2021-05-03 RX ORDER — SODIUM CHLORIDE 9 MG/ML
INJECTION, SOLUTION INTRAVENOUS CONTINUOUS
Status: DISCONTINUED | OUTPATIENT
Start: 2021-05-03 | End: 2021-05-04

## 2021-05-03 RX ORDER — TAMSULOSIN HYDROCHLORIDE 0.4 MG/1
0.8 CAPSULE ORAL EVERY EVENING
Status: DISCONTINUED | OUTPATIENT
Start: 2021-05-03 | End: 2021-05-07

## 2021-05-03 RX ORDER — METOPROLOL SUCCINATE 25 MG/1
25 TABLET, EXTENDED RELEASE ORAL
Status: DISCONTINUED | OUTPATIENT
Start: 2021-05-04 | End: 2021-05-04

## 2021-05-03 RX ORDER — METOCLOPRAMIDE HYDROCHLORIDE 5 MG/ML
10 INJECTION INTRAMUSCULAR; INTRAVENOUS EVERY 8 HOURS PRN
Status: DISCONTINUED | OUTPATIENT
Start: 2021-05-03 | End: 2021-05-07

## 2021-05-03 RX ORDER — ACETAMINOPHEN 325 MG/1
650 TABLET ORAL EVERY 6 HOURS PRN
Status: DISCONTINUED | OUTPATIENT
Start: 2021-05-03 | End: 2021-05-07

## 2021-05-03 RX ORDER — FLUTICASONE PROPIONATE 50 MCG
1 SPRAY, SUSPENSION (ML) NASAL DAILY
Status: DISCONTINUED | OUTPATIENT
Start: 2021-05-03 | End: 2021-05-07

## 2021-05-03 RX ORDER — ONDANSETRON 2 MG/ML
4 INJECTION INTRAMUSCULAR; INTRAVENOUS EVERY 6 HOURS PRN
Status: DISCONTINUED | OUTPATIENT
Start: 2021-05-03 | End: 2021-05-07

## 2021-05-04 PROCEDURE — 87040 BLOOD CULTURE FOR BACTERIA: CPT | Performed by: INTERNAL MEDICINE

## 2021-05-04 PROCEDURE — 83605 ASSAY OF LACTIC ACID: CPT | Performed by: INTERNAL MEDICINE

## 2021-05-04 PROCEDURE — 85025 COMPLETE CBC W/AUTO DIFF WBC: CPT | Performed by: INTERNAL MEDICINE

## 2021-05-04 PROCEDURE — 80048 BASIC METABOLIC PNL TOTAL CA: CPT | Performed by: HOSPITALIST

## 2021-05-04 PROCEDURE — 83735 ASSAY OF MAGNESIUM: CPT | Performed by: HOSPITALIST

## 2021-05-04 PROCEDURE — S0030 INJECTION, METRONIDAZOLE: HCPCS | Performed by: INTERNAL MEDICINE

## 2021-05-04 RX ORDER — MORPHINE SULFATE 2 MG/ML
2 INJECTION, SOLUTION INTRAMUSCULAR; INTRAVENOUS EVERY 2 HOUR PRN
Status: DISCONTINUED | OUTPATIENT
Start: 2021-05-04 | End: 2021-05-07

## 2021-05-04 RX ORDER — METRONIDAZOLE 500 MG/100ML
500 INJECTION, SOLUTION INTRAVENOUS EVERY 8 HOURS
Status: DISCONTINUED | OUTPATIENT
Start: 2021-05-04 | End: 2021-05-04

## 2021-05-04 RX ORDER — MORPHINE SULFATE 4 MG/ML
4 INJECTION, SOLUTION INTRAMUSCULAR; INTRAVENOUS EVERY 2 HOUR PRN
Status: DISCONTINUED | OUTPATIENT
Start: 2021-05-04 | End: 2021-05-07

## 2021-05-04 RX ORDER — SODIUM CHLORIDE, SODIUM LACTATE, POTASSIUM CHLORIDE, CALCIUM CHLORIDE 600; 310; 30; 20 MG/100ML; MG/100ML; MG/100ML; MG/100ML
INJECTION, SOLUTION INTRAVENOUS CONTINUOUS
Status: DISCONTINUED | OUTPATIENT
Start: 2021-05-04 | End: 2021-05-07

## 2021-05-04 RX ORDER — MORPHINE SULFATE 2 MG/ML
1 INJECTION, SOLUTION INTRAMUSCULAR; INTRAVENOUS EVERY 2 HOUR PRN
Status: DISCONTINUED | OUTPATIENT
Start: 2021-05-04 | End: 2021-05-07

## 2021-05-04 RX ORDER — LEVOFLOXACIN 5 MG/ML
750 INJECTION, SOLUTION INTRAVENOUS EVERY 24 HOURS
Status: DISCONTINUED | OUTPATIENT
Start: 2021-05-04 | End: 2021-05-04

## 2021-05-04 NOTE — ED PROVIDER NOTES
Patient Seen in: [facility] Emergency Department      History   Patient presents with:  Abdomen/Flank Pain    Stated Complaint: diarrhea, chills    HPI/Subjective:   HPI    CHIEF COMPLAINT: Diarrhea, chills, body aches    HISTORY OF PRESENT ILLNESS: and agree. The patient's family history is reviewed and is noncontributory to the presenting problem, except as indicated as above.     Objective:   Past Medical History:   Diagnosis Date   • Acute renal failure ([facility] [address] 6/5/2019   • BPH (benign prostatic hype Current:BP (!) 164/66 (BP Location: Right arm)   Pulse 63   Temp 98.9 °F (37.2 °C) (Oral)   Resp 18   Ht 198.1 cm (6' 5.99\")   Wt 133.8 kg   SpO2 97%   BMI 34.09 kg/m²         Physical Exam    Nursing notes and vital signs reviewed     General Appea Phosphorus 2.1 (*)     All other components within normal limits   BASIC METABOLIC PANEL (8) - Abnormal; Notable for the following components:    Glucose 101 (*)     Potassium 2.8 (*)     BUN/CREA Ratio 8.8 (*)     Calcium, Total 7.9 (*)     All other c following orders were created for panel order CBC WITH DIFFERENTIAL WITH PLATELET.   Procedure                               Abnormality         Status                     ---------                               -----------         ------ review the patient's history and noted that patient had an obstructive series on May 1 that showed normal gas pattern. No obstructive pattern. MDM      I discussed the radiology and laboratory results with the patient.  I discussed the diagnosi

## 2021-05-04 NOTE — PLAN OF CARE
Pt admitted from the ED with diarrhea almost every two hours, pt has discomfort and pain in abdomen 8/10 gave ms iv.   Pt has no temp, about 420am pt vomited and had diarrhea gave zofran and messaged the MD.  Pt stated he now felt better, pt was admitted an protocol/standard of care  - Consider collaborating with pharmacy to review patient's medication profile  - Implement strategies to promote bladder emptying  Outcome: Progressing     Problem: METABOLIC/FLUID AND ELECTROLYTES - ADULT  Goal: Electrolytes kitty

## 2021-05-04 NOTE — CM/SW NOTE
05/04/21 0800   Discharge disposition   Expected discharge disposition Home or Self   Name of [address] [facility]       MSW, Charge and RN discussed patient's post d/c needs in care rounds.  No identified needs at this time, MSW will remain

## 2021-05-04 NOTE — PROGRESS NOTES
Pt vomited up food with clear liquid, and had large diarrhea notified ahider RODGERS. Visit Information Date & Time Provider Department Dept. Phone Encounter #  
 1/19/2017  8:55 AM Jack Crespo, [address] [facility] [phone] [id number] Follow-up Instructions Return in about 4 weeks (around 2/16/2017) for Anxiety and elevated blood pressure. Upcoming Health Maintenance Date Due ZOSTER VACCINE AGE 60> 12/7/2016 COLONOSCOPY 2/22/2017 EYE EXAM RETINAL OR DILATED Q1 11/9/2017* HEMOGLOBIN A1C Q6M 3/8/2017 LIPID PANEL Q1 3/8/2017 Pneumococcal 19-64 Highest Risk (2 of 3 - PCV13) 5/11/2017 FOOT EXAM Q1 5/11/2017 MICROALBUMIN Q1 9/8/2017 PAP AKA CERVICAL CYTOLOGY 2/6/2018 BREAST CANCER SCRN MAMMOGRAM 5/27/2018 DTaP/Tdap/Td series (2 - Td) 2/6/2025 *Topic was postponed. The date shown is not the original due date. Allergies as of 1/19/2017  Review Complete On: 1/19/2017 By: Iris Montano LPN No Known Allergies Current Immunizations  Reviewed on 2/6/2015 Name Date Tdap 2/6/2015 Not reviewed this visit You Were Diagnosed With   
  
 Codes Comments Essential hypertension    -  Primary ICD-10-CM: I10 
ICD-9-CM: 401.9 Controlled type 2 diabetes mellitus without complication, without long-term current use of insulin ([facility]     ICD-10-CM: E11.9 ICD-9-CM: 250.00 Decreased hearing, bilateral     ICD-10-CM: H91.93 
ICD-9-CM: 389.9 Essential hypertension with goal blood pressure less than 130/80     ICD-10-CM: I10 
ICD-9-CM: 401.9 Anxiety     ICD-10-CM: F41.9 ICD-9-CM: 300.00 Vitals BP Pulse Temp Resp Height(growth percentile) Weight(growth percentile) 154/84 (BP 1 Location: Right arm, BP Patient Position: Sitting) 60 98.3 °F (36.8 °C) (Oral) 17 5' 3\" (1.6 m) 160 lb (72.6 kg) LMP SpO2 BMI OB Status Smoking Status 01/18/2000 97% 28.34 kg/m2 Postmenopausal Never Smoker Vitals History BMI and BSA Data Body Mass Index Body Surface Area 28.34 kg/m 2 1.8 m 2 Preferred Pharmacy Pharmacy Name Phone [facility] [address] [place] [address] [phone] Your Updated Medication List  
  
   
This list is accurate as of: 1/19/17  9:53 AM.  Always use your most recent med list.  
  
  
  
  
 atorvastatin 40 mg tablet Commonly known as:  LIPITOR Take 1 Tab by mouth nightly. FLUoxetine 20 mg capsule Commonly known as:  PROzac Take 2 Caps by mouth daily for 30 days. hydroCHLOROthiazide 25 mg tablet Commonly known as:  HYDRODIURIL Take 1 Tab by mouth daily. lisinopril 40 mg tablet Commonly known as:  Nonah Leanne Take 1 Tab by mouth daily. Indications: Hypertension  
  
 metFORMIN  mg tablet Commonly known as:  GLUCOPHAGE XR Take 1 Tab by mouth daily (with dinner). Prescriptions Sent to Pharmacy Refills FLUoxetine (PROZAC) 20 mg capsule 3 Sig: Take 2 Caps by mouth daily for 30 days. Class: Normal  
 Pharmacy: [facility] [place] [address] Ph #: [phone] Route: Oral  
 lisinopril (PRINIVIL, ZESTRIL) 40 mg tablet 3 Sig: Take 1 Tab by mouth daily. Indications: Hypertension Class: Normal  
 Pharmacy: [facility] [place] [address] Ph #: [phone] Route: Oral  
  
We Performed the Following HEMOGLOBIN A1C WITH EAG [84611 CPT(R)] METABOLIC PANEL, BASIC [34195 CPT(R)] REFERRAL TO ENT-OTOLARYNGOLOGY [BRW73 Custom] Comments:  
 Please evaluate patient for decreased hearing. Follow-up Instructions Return in about 4 weeks (around 2/16/2017) for Anxiety and elevated blood pressure. Referral Information Referral ID Referred By Referred To  
  
 [id number] Hemal KRISHNAN Not Available Visits Status Start Date End Date 1 New Request 1/19/17 1/19/18  If your referral has a status of pending review or denied, additional information will be sent to support the outcome of this decision. Patient Instructions Varicella Virus Vaccine (By injection) Varicella Virus Vaccine (silvana-i-AMBER-a VYE-rebeka VAX-een) Varivax® prevents chicken pox (varicella virus). Zostavax® prevents shingles (herpes zoster virus). Brand Name(s):Varivax, Zostavax There may be other brand names for this medicine. When This Medicine Should Not Be Used:  
Varivax® or Zostavax® should not be given to anyone who has had an allergic reaction to varicella virus vaccine, gelatin, or neomycin, or to a child who has a fever. You should not receive either vaccine if you are pregnant or you are planning pregnancy. Also, these vaccines should not be given to a person who has an immune system problem (such as AIDS or HIV, a blood or bone marrow disorder, active and untreated tuberculosis (TB)) or who is taking medicine that weakens the immune system (such as high doses of steroids or medicine to treat cancer). Varivax® and Zostavax® may make you sick if your immune system is already weak, because they are made from a live varicella virus. Zostavax® should not be given to children. How to Use This Medicine:  
Injectable · Your doctor will prescribe your exact dose and tell you how often it should be given. This medicine is given as a shot under your skin. · A nurse or other health provider will give you this medicine. · Varivax® ¨ Most people who need the Varivax® vaccine will need 2 shots. Children 12 months to 15years of age should be give the second shot no sooner than 3 months after the first vaccine. Teenagers and adults should have a booster shot 4 to 8 weeks after the first vaccine. Your doctor can answer specific questions about your situation, especially if you need to follow a different schedule. · Zostavax® ¨ You should receive only 1 dose of Zostavax®, unless your doctor tells you otherwise. ¨ Zostavax® should not be used in place of Varivax® to prevent chicken pox. Zostavax® should also not be used to treat shingles. Zostavax® is only preventive, although it may help ease pain if you get shingles even after receiving the vaccine. · Read and follow the patient instructions that come with this medicine. Talk to your doctor or pharmacist if you have any questions. If a dose is missed: · It is important that Varivax® be given at the proper time. If a scheduled shot is missed, call your doctor to make another appointment as soon as possible. Drugs and Foods to Avoid: Ask your doctor or pharmacist before using any other medicine, including over-the-counter medicines, vitamins, and herbal products. · Varivax® and Zostavax® should not be given with Pneumovax® pneumococcal vaccine. Tell your doctor about your vaccine history, or if you plan to get a flu shot or other vaccines. · A child should not take any medicine that contains aspirin or another salicylate for at least 6 weeks after receiving Varivax®. Check the labels of any pain, headache, or cold medicine your child uses to be sure they do not contain aspirin or salicylic acid. In general, children should never be given aspirin because of the risk of Reye syndrome. · You or your child should wait 2 months after receiving Varivax® to receive varicella zoster immune globulin (VZIG) or other immune globulin medicines. You or your child should wait at least 5 months after receiving immune globulin, VZIG, or a blood or plasma transfusion before you get the Varivax® vaccine. · Tell your doctor if you or your child is using a medicine that weakens your immune system, such as a steroid or cancer medicine. Varivax® and Zostavax® may not work as well, or they could make you sick. Warnings While Using This Medicine: · It is not safe to take this medicine during pregnancy.  It could harm an unborn baby. Tell your doctor right away if you become pregnant. Avoid getting pregnant for 3 months after getting either the Varivax® or Zostavax® vaccine. · Tell your doctor if you are breastfeeding before you are given Varivax® or Zostavax®. · The virus that is in this vaccine could be passed to others, even if you (or your child) do not feel sick. Avoid close contact with anyone who has a high risk for chicken pox or shingles infection. The waiting time for Varivax® is at least 6 weeks. High-risk people include pregnant women,  babies, and people who cannot fight infection, such as patients who have bone marrow disease, cancer, or AIDS. Talk to your doctor if you might be with a high-risk person. · Varivax® may not always prevent chicken pox. Zostavax® may not always prevent shingles. Possible Side Effects While Using This Medicine:  
Call your doctor right away if you notice any of these side effects: · Allergic reaction: Itching or hives, swelling in your face or hands, swelling or tingling in your mouth or throat, chest tightness, trouble breathing · Blistering, peeling, or red skin rash · Cough, chills, runny or stuffy nose, or cold-like symptoms · High fever (at least 102 degrees F in children) · Chicken pox · Swollen glands where the shot was given · Unusual bleeding or bruising If you notice these less serious side effects, talk with your doctor:  
· Headache, or ear, joint, or muscle pain · Mild skin rash, itching, or dryness · Pain, redness, itching, swelling, rash, or a hard lump where the shot was given If you notice other side effects that you think are caused by this medicine, tell your doctor. Call your doctor for medical advice about side effects. You may report side effects to FDA at 6-304-JCM-6472 ©  [address] is for End User's use only and may not be sold, redistributed or otherwise used for commercial purposes. The above information is an  only. It is not intended as medical advice for individual conditions or treatments. Talk to your doctor, nurse or pharmacist before following any medical regimen to see if it is safe and effective for you. Introducing [facility]! Dear Dominik Condon: Thank you for requesting a [employer] account. Our records indicate that you already have an active [employer] account. You can access your account anytime at [email or web address] Did you know that you can access your hospital and ER discharge instructions at any time in [employer]? You can also review all of your test results from your hospital stay or ER visit. Additional Information If you have questions, please visit the Frequently Asked Questions section of the [employer] website at [email or web address] Remember, [employer] is NOT to be used for urgent needs. For medical emergencies, dial 911. Now available from your iPhone and Android! Please provide this summary of care documentation to your next provider. Your primary care clinician is listed as Lisa Olea. If you have any questions after today's visit, please call [phone].

## 2021-05-04 NOTE — PROGRESS NOTES
Patient admitted via Cart from ED. Oriented to room. Safety precautions initiated. Bed in low position.   Call light in reach

## 2021-05-04 NOTE — PLAN OF CARE
Problem: RESPIRATORY - ADULT  Goal: Achieves optimal ventilation and oxygenation  Description: INTERVENTIONS:  - Assess for changes in respiratory status  - Assess for changes in mentation and behavior  - Position to facilitate oxygenation and minimize r electrolyte replacements, including rhythm and repeat lab results as appropriate  - Fluid restriction as ordered  - Instruct patient on fluid and nutrition restrictions as appropriate  Outcome: Progressing     Problem: Patient/Family Goals  Goal: Patient/F

## 2021-05-04 NOTE — CONSULTS
[facility]  Report of Consultation    Najma Le Patient Status:  Inpatient    10/7/1955 MRN [id number]   [facility] 3NW-A Attending Jennifer España MD   [address] Day # 1 PCP Yina Cantu MD     21    Reason for Consultation:    [facility] [address]is   • CYSTOURETHROSCOPY  5/4/16    cystoscopy Dr. Brynn Cardenas   • UPPER GI ENDOSCOPY - REFERRAL  8/2015    severe esophagitis, mult superficial duodenal ulcers (bx benign), gastric bx (-) hpylori       Family History:    Family History   Problem encounter. furosemide 20 MG Oral Tab, Take 1 tablet (20 mg total) by mouth daily. , Disp: 90 tablet, Rfl: 2  tamsulosin (FLOMAX) cap, Take 2 capsules (0.8 mg total) by mouth every evening., Disp: 180 capsule, Rfl: 2  Metoprolol Succinate ER 25 MG Oral Tabl 8.1*  --  8.5 7.8*   MG  --   --   --   --  1.8       Recent Labs   Lab 04/29/21  1345 05/03/21 1955   ALT 26 28   AST 23 16   ALB 4.3 3.3*       No results for input(s): TROP in the last 168 hours.           Radiology:    CT ABDOMEN+PELVIS(CONTRAST ONLY)( containing inguinal hernias. Postoperative changes noted. PELVIC ORGANS:  Decompressed bladder. LYMPH NODES:  Unremarkable. BONES:  Degenerative changes in the spine. OTHER:  None. CONCLUSION:  1.  There are multiple air-fluid levels throughout aorta is normal in caliber. It has atherosclerotic calcifications. IVC and bilateral iliac venous stents are unchanged. LYMPH NODES: There is no evidence of mesenteric, retroperitoneal or inguinal adenopathy.  BOWEL: The stomach is distended with gas and fl OBSTRUCTIVE SERIES ROUTINE(2 VW)(CPT=74019), 5/01/2021, 1:25 PM.  INDICATIONS:  diarrhea and chills  PATIENT STATED HISTORY: (As transcribed by Technologist)  Patient offered no additional history at this time.     FINDINGS:  There are mildly prominent air- hyperplasia)     Syncope     At risk for falling     Central cord syndrome [facility])     Cervical spine fracture (HCC)     Essential hypertension     Restlessness and agitation     Delirium     Nontraumatic retroperitoneal hematoma     DVT (deep venous thrombo

## 2021-05-04 NOTE — H&P
[facility] Hospitalist H&P       CC: Patient presents with:  Abdomen/Flank Pain       PCP: Monica Palm MD    History of Present Illness:  Mr. 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 Resulted to multi organ failure.   Seasonal                Runny nose     Home Medic lb (133.8 kg)  01/25/21 : (!) 301 lb 12.8 oz (136.9 kg)  12/21/20 : 296 lb 3.2 oz (134.4 kg)  10/13/20 : 299 lb (135.6 kg)    Gen: No acute distress, alert and oriented   HEENT: sclera anicteric, oral mucosa moist  Pulm: Lungs clear bilaterally, good inspi CONTRAST USED:  100cc of Omnipaque 350  FINDINGS:  LUNG BASE:  Coronary artery disease. Scattered atelectasis. LIVER:  Unremarkable. BILIARY:  Cholecystectomy. SPLEEN:  Unremarkable. PANCREAS:  Unremarkable. ADRENALS:  Unremarkable.  KIDNEYS:  There are julien SERVICE: 04.29.2021 CT ABDOMEN+PELVIS(CONTRAST ONLY)(CPT=74177) CLINICAL INDICATION: Abdominal pain, unspecified abdominal location. COMPARISON STUDY: CT abdomen and pelvis 6/10/2020.  TECHNIQUE:  Axial images of the abdomen and pelvis were performed from t [facility] on 4/29/2021 at 2:57 PM    XR CHEST AP PORTABLE  (CPT=71045)    Result Date: 4/29/2021  PROCEDURE:  XR CHEST AP PORTABLE  (CPT=71045)  TECHNIQUE:  AP chest radiograph was obtained.   COMPARISON:  [facility] , XR, XR CHEST AP PORTABLE  (CPT=71010), 8/11/ Thomas Romero MD on 5/03/2021 at 8:36 PM     Finalized by (CST): Kim Lozoya MD on 5/03/2021 at 8:39 PM       XR ABDOMEN OBSTRUCTIVE SERIES ROUTINE(2 VW)(CPT=74019)    Result Date: 5/1/2021  PROCEDURE:  XR ABDOMEN OBSTRUCTIVE SERIES ROUTINE(2 VW)(CPT=74019) Leukocytosis  - stool studies pending  - repeat labs this AM  - Blood cultures x 2  - abx as noted above, to start after labs obtained    # HTN / HLD  - hold home metoprolol with lower BP this AM  - hold home lasix for now while NPO    # BPH  - home flomax

## 2021-05-04 NOTE — CONSULTS
GASTROENTEROLOGY CONSULTATION  Peter Arceo MD    Department of Gastroenterology  [address] [place] [address]. Patient Status:  Inpatient    10/7/1955 MRN [id number]   [facility] 3NW-A Attending Tahir José MD   [facility] Day [facility] 1 COLONOSCOPY & POLYPECTOMY  09/2017    polyps; tics; repeat 5 yrs (adenoma)   • COLONOSCOPY,BIOPSY  5/20/2011    Performed by Mary Rivera at [facility]   • CYSTOURETHROSCOPY  5/4/16    cystoscopy Dr. Dov Montes  8 Daily    Review of Systems:  Gastrointestinal: See above  General: Denies fatigue, chills/fever, night sweats, weight loss, loss of appetite, weight gain, sleep disturbance. Cardiovascular: Denies history of heart murmur, chest pain or angina.   Respirator 143 05/04/2021    K 3.1 05/04/2021     05/04/2021    CO2 22.0 05/04/2021     05/04/2021    CA 7.8 05/04/2021    ALB 3.3 05/03/2021    ALKPHO 100 05/03/2021    BILT 0.8 05/03/2021    TP 7.0 05/03/2021    AST 16 05/03/2021    ALT 28 05/03/2021 evidence of neoplasm. This area was examined carefully. A 6 mm sessile polyp was removed from the transverse colon with a cold forcep. No other polyps, masses or lesions were found throughout the colon. Small internal hemorrhoids were noted.   There wer

## 2021-05-05 PROCEDURE — 80048 BASIC METABOLIC PNL TOTAL CA: CPT | Performed by: INTERNAL MEDICINE

## 2021-05-05 PROCEDURE — 84132 ASSAY OF SERUM POTASSIUM: CPT | Performed by: INTERNAL MEDICINE

## 2021-05-05 PROCEDURE — 83735 ASSAY OF MAGNESIUM: CPT | Performed by: INTERNAL MEDICINE

## 2021-05-05 PROCEDURE — 85025 COMPLETE CBC W/AUTO DIFF WBC: CPT | Performed by: INTERNAL MEDICINE

## 2021-05-05 PROCEDURE — 84100 ASSAY OF PHOSPHORUS: CPT | Performed by: INTERNAL MEDICINE

## 2021-05-05 RX ORDER — METOPROLOL SUCCINATE 25 MG/1
25 TABLET, EXTENDED RELEASE ORAL
Status: DISCONTINUED | OUTPATIENT
Start: 2021-05-05 | End: 2021-05-07

## 2021-05-05 RX ORDER — MAGNESIUM SULFATE HEPTAHYDRATE 40 MG/ML
2 INJECTION, SOLUTION INTRAVENOUS ONCE
Status: DISCONTINUED | OUTPATIENT
Start: 2021-05-05 | End: 2021-05-05

## 2021-05-05 RX ORDER — POTASSIUM CHLORIDE 14.9 MG/ML
20 INJECTION INTRAVENOUS ONCE
Status: DISCONTINUED | OUTPATIENT
Start: 2021-05-05 | End: 2021-05-05

## 2021-05-05 RX ORDER — MAGNESIUM OXIDE 400 MG (241.3 MG MAGNESIUM) TABLET
400 TABLET ONCE
Status: COMPLETED | OUTPATIENT
Start: 2021-05-05 | End: 2021-05-05

## 2021-05-05 NOTE — CM/SW NOTE
BPCI-Advanced Medicare Program Note:  Plan of care reviewed for care coordination and discharge planning. Noted pt falls under  BPCI/Medicare program, with DRG _389,W___ for . Ely Watson Gastrointestinal obstruction   NSOC recommendations for home___ pending pt progr

## 2021-05-05 NOTE — PROGRESS NOTES
[facility] Hospitalist Progress Note     [facility]      SUBJECTIVE:  Feeling ok, still having diarrhea -- thinks last episode may be slightly smaller  Tolerating liquids    OBJECTIVE:  Temp:  [97.6 °F (36.4 °C)-98.7 °F (37.1 °C)] 98.6 °F (37 °C)  Pulse: ABDOMEN+PELVIS(CPT=74176), 8/09/2015, 2:02 PM.  INDICATIONS:  diarrhea, chills  TECHNIQUE:  CT scanning was performed from the dome of the diaphragm to the pubic symphysis with non-ionic intravenous contrast material. Post contrast coronal MPR imaging was a bowel obstruction. There is mild stranding within the central mesentery. These imaging findings may represent enteritis in the appropriate clinical setting. 2. There is mild wall thickening of the rectum.   This may relate to proctitis or underdistentio loops in the right lower quadrant. Colon is also relatively decompressed. The findings are concerning for small bowel obstruction with transition in the distal bowel loops in the right side of the abdomen. Kesha Riedel PERITONEUM: No free fluid.  ABDOMINAL WALL: There abdomen and overlying the pelvis. There are a few air-fluid level seen within the left upper quadrant the abdomen. No free intra-abdominal air is identified. Vascular stents are seen.             CONCLUSION:  There are multiple air-fluid levels projectin (TYLENOL) tab 650 mg, 650 mg, Oral, Q6H PRN  ondansetron HCl (ZOFRAN) injection 4 mg, 4 mg, Intravenous, Q6H PRN  Metoclopramide HCl (REGLAN) injection 10 mg, 10 mg, Intravenous, Q8H PRN  tamsulosin HCl (FLOMAX) cap 0.8 mg, 0.8 mg, Oral, QPM  Fluticasone P Hospitalist

## 2021-05-05 NOTE — PROGRESS NOTES
[facility]  Progress Note    [address] [facility] Patient Status:  Inpatient    10/7/1955 MRN [id number]   [facility] 3NW-A Attending Lynda Crawford MD   Hosp Day # 2 PCP Pasquale Grace MD     Subjective:  Continues to complain of diarrhea POLYPECTOMY  09/2017    polyps; tics; repeat 5 yrs (adenoma)   • COLONOSCOPY,BIOPSY  5/20/2011    Performed by Roman Silvestre at [facility]   • CYSTOURETHROSCOPY  5/4/16    cystoscopy Dr. Ramon Enrique   • UPPER GI ENDOSCOPY - REFERRAL  8/2015    sever

## 2021-05-05 NOTE — PLAN OF CARE
Pt axo4 VSS, pt resting in bed pt denies any pain. Pt ambulating to void and have BM in the toilet. Pt has PIV infusing LR 100ml/hr, C-diff neg, lungs clear, abdomen distended tender to touch, passing gas and belching.   Pt care plan reviewed with patient Progressing     Problem: METABOLIC/FLUID AND ELECTROLYTES - ADULT  Goal: Electrolytes maintained within normal limits  Description: INTERVENTIONS:  - Monitor labs and rhythm and assess patient for signs and symptoms of electrolyte imbalances  - Administer

## 2021-05-05 NOTE — PROGRESS NOTES
[facility]  Progress Note    [address] [facility] Patient Status:  Inpatient    10/7/1955 MRN [id number]   [facility] 3NW-A Attending Florina Jules MD   Hosp Day # 2 PCP Abdias Rhodes MD     Subjective:    Patient reports continued loose s Hyperglycemia     Abdominal pain, acute     Diarrhea, unspecified type      Impression:     77 y/o with diarrhea, abdominal distension  c diff negative  Stool panel negative  Tolerating clears  Continued loose stools    Plan:    Ok to advance diet from [employer]

## 2021-05-06 PROCEDURE — 83735 ASSAY OF MAGNESIUM: CPT | Performed by: INTERNAL MEDICINE

## 2021-05-06 PROCEDURE — 84100 ASSAY OF PHOSPHORUS: CPT | Performed by: INTERNAL MEDICINE

## 2021-05-06 PROCEDURE — 80048 BASIC METABOLIC PNL TOTAL CA: CPT | Performed by: INTERNAL MEDICINE

## 2021-05-06 PROCEDURE — 84132 ASSAY OF SERUM POTASSIUM: CPT | Performed by: INTERNAL MEDICINE

## 2021-05-06 PROCEDURE — 85025 COMPLETE CBC W/AUTO DIFF WBC: CPT | Performed by: INTERNAL MEDICINE

## 2021-05-06 RX ORDER — LOPERAMIDE HYDROCHLORIDE 2 MG/1
2 CAPSULE ORAL ONCE
Status: COMPLETED | OUTPATIENT
Start: 2021-05-06 | End: 2021-05-06

## 2021-05-06 RX ORDER — POTASSIUM CHLORIDE 20 MEQ/1
40 TABLET, EXTENDED RELEASE ORAL EVERY 4 HOURS
Status: COMPLETED | OUTPATIENT
Start: 2021-05-06 | End: 2021-05-06

## 2021-05-06 RX ORDER — MAGNESIUM OXIDE 400 MG (241.3 MG MAGNESIUM) TABLET
400 TABLET ONCE
Status: COMPLETED | OUTPATIENT
Start: 2021-05-06 | End: 2021-05-06

## 2021-05-06 RX ORDER — LOPERAMIDE HYDROCHLORIDE 2 MG/1
4 CAPSULE ORAL 2 TIMES DAILY
Status: DISCONTINUED | OUTPATIENT
Start: 2021-05-06 | End: 2021-05-07

## 2021-05-06 RX ORDER — MAGNESIUM SULFATE HEPTAHYDRATE 40 MG/ML
2 INJECTION, SOLUTION INTRAVENOUS ONCE
Status: DISCONTINUED | OUTPATIENT
Start: 2021-05-06 | End: 2021-05-06

## 2021-05-06 RX ORDER — LOPERAMIDE HYDROCHLORIDE 2 MG/1
2 CAPSULE ORAL 4 TIMES DAILY PRN
Status: DISCONTINUED | OUTPATIENT
Start: 2021-05-06 | End: 2021-05-06

## 2021-05-06 NOTE — PLAN OF CARE
Pt A&Ox4, systolic in 731I, other VSS on RA. Pt has had 4-5 medium to large bowel liquidy bowel movements. MDs aware. Potassium replaced, scheduled Imodium started. Tolerating regu,ar diet. Up ad laila. Will continue to monitor.     Problem: RESPIRATORY - MAGALYS maintained within normal limits  Description: INTERVENTIONS:  - Monitor labs and rhythm and assess patient for signs and symptoms of electrolyte imbalances  - Administer electrolyte replacement as ordered  - Monitor response to electrolyte replacements, in

## 2021-05-06 NOTE — PROGRESS NOTES
[facility]  Progress Note    Reji Grabiel Tinpuma Patient Status:  Inpatient    10/7/1955 MRN [id number]   [facility] 3NW-A Attending Shelly Mae, *   Hosp Day # 3 PCP Stan Patel MD     Subjective:    Patient reports tolerating Metabolic acidosis     Hyperglycemia     Abdominal pain, acute     Diarrhea, unspecified type      Impression:     75 y/o with diarrhea, abdominal distension   c diff negative  Stool negative  Tolerating diet    Plan:    Regular diet  Encourage ambulation/

## 2021-05-06 NOTE — PROGRESS NOTES
[facility]  Progress Note    [address] [facility] Patient Status:  Inpatient    10/7/1955 MRN [id number]   [facility] 3NW-A Attending Thomas Whyte, *   Hosp Day # 3 PCP Sandra Foote MD     Subjective:  No GI complaints other than p POLYPECTOMY  09/2017    polyps; tics; repeat 5 yrs (adenoma)   • COLONOSCOPY,BIOPSY  5/20/2011    Performed by Danilo Adam at [facility]   • CYSTOURETHROSCOPY  5/4/16    cystoscopy Dr. Madison Hutson   • UPPER GI ENDOSCOPY - REFERRAL  8/2015    sever

## 2021-05-06 NOTE — PLAN OF CARE
A&Ox4. VSS. RA.  GI: Abdomen soft, nondistended. Passing gas. Denies nausea. : Voids  Denies pain. Up ad laila. Diet: Soft diet--tolerating  IVF running per order. All appropriate safety measures in place. All questions and concerns addressed.  Will co METABOLIC/FLUID AND ELECTROLYTES - ADULT  Goal: Electrolytes maintained within normal limits  Description: INTERVENTIONS:  - Monitor labs and rhythm and assess patient for signs and symptoms of electrolyte imbalances  - Administer electrolyte replacement a

## 2021-05-06 NOTE — PROGRESS NOTES
[facility] Hospitalist Progress Note     [facility]    c c follow up    SUBJECTIVE:   had large watery bm this AM, was hoping he was turning the corner. Gets abdominal pressure while having bm. No other complaints.  No LH    OBJECTIVE:  Temp:  [98 °F (36.7 40 mEq, 40 mEq, Oral, Q4H  Loperamide HCl (IMODIUM) cap 2 mg, 2 mg, Oral, QID PRN  Metoprolol Succinate ER (Toprol XL) 24 hr tab 25 mg, 25 mg, Oral, 2x Daily(Beta Blocker)  morphINE sulfate (PF) 2 MG/ML injection 1 mg, 1 mg, Intravenous, Q2H PRN   Or  morp BPH  - home flomax     # Hx of DVT  - patient with prior IVC filter which was removed in 2019  - reported history of multisystem organ failure with heparin  - will hold on subQ heparin, this was also held on recent admission and patient used SCDs and ambul

## 2021-05-07 VITALS
WEIGHT: 295 LBS | HEART RATE: 65 BPM | BODY MASS INDEX: 34.13 KG/M2 | OXYGEN SATURATION: 98 % | DIASTOLIC BLOOD PRESSURE: 69 MMHG | RESPIRATION RATE: 18 BRPM | HEIGHT: 77.99 IN | SYSTOLIC BLOOD PRESSURE: 148 MMHG | TEMPERATURE: 98 F

## 2021-05-07 PROCEDURE — 83735 ASSAY OF MAGNESIUM: CPT | Performed by: INTERNAL MEDICINE

## 2021-05-07 PROCEDURE — 80048 BASIC METABOLIC PNL TOTAL CA: CPT | Performed by: INTERNAL MEDICINE

## 2021-05-07 RX ORDER — MAGNESIUM OXIDE 400 MG (241.3 MG MAGNESIUM) TABLET
400 TABLET ONCE
Status: COMPLETED | OUTPATIENT
Start: 2021-05-07 | End: 2021-05-07

## 2021-05-07 RX ORDER — LOPERAMIDE HYDROCHLORIDE 2 MG/1
4 CAPSULE ORAL 4 TIMES DAILY PRN
Qty: 30 CAPSULE | Refills: 0 | Status: ON HOLD | OUTPATIENT
Start: 2021-05-07 | End: 2021-05-13

## 2021-05-07 RX ORDER — POTASSIUM CHLORIDE 20 MEQ/1
40 TABLET, EXTENDED RELEASE ORAL ONCE
Status: COMPLETED | OUTPATIENT
Start: 2021-05-07 | End: 2021-05-07

## 2021-05-07 RX ORDER — FUROSEMIDE 20 MG/1
20 TABLET ORAL DAILY
Qty: 90 TABLET | Refills: 2 | Status: SHIPPED | COMMUNITY
Start: 2021-05-09 | End: 2021-11-04

## 2021-05-07 NOTE — DISCHARGE SUMMARY
General Medicine Discharge Summary     Patient ID:  Arnulfo Morillo  76year old  [id number]  10/7/1955    Admit date: 5/3/2021    Discharge date and time:  5/7/21    Attending Physician: Og Burrell, *     Primary Care Physician: Alfredo Last, formed.   On Monday, 5/9/21, you may resume your lasix when your bowel movements are formed.   Hold lasix if you are having watery diarrhea and call your primary care doctor    # BPH  - home flomax     # Hx of DVT  - patient with prior IVC filter which was state.  There multiple air-fluid levels throughout the small bowel. The small bowel measures up to 3.2 cm in caliber. No transition point is identified. There is mild stranding within the central mesentery.   There postoperative changes from appendectomy FINDINGS: LUNG BASES: Normal. LIVER: Normal. GALLBLADDER/BILIARY TREE: Patient is status post cholecystectomy. PANCREAS: Normal. SPLEEN: Normal ADRENAL GLANDS: Normal. KIDNEYS URETERS AND BLADDER: There is a peripelvic cyst in the right kidney unchanged.  B 4/29/2021 at 9:09 PM     Finalized by (CST): Danelle Lynn MD on 4/29/2021 at 9:10 PM       XR ABDOMEN OBSTRUCTIVE SERIES ROUTINE(2 VW)(CPT=74019)    Result Date: 5/3/2021  PROCEDURE:  XR ABDOMEN OBSTRUCTIVE SERIES ROUTINE(2 VW)(CPT=74019)  TECHNIQUE:  2 tip in the stomach. Aortic stent graft noted. CONCLUSION:  Nonspecific bowel gas pattern. No free air. No suspicious calcifications.   Dictated by (CST): Dawn Goodpasture, MD on 5/01/2021 at 1:58 PM     Finalized by (CST): Dawn Goodpasture, MD on 5/

## 2021-05-07 NOTE — PROGRESS NOTES
[facility]  Progress Note    Oralee Jet Castlepuma Patient Status:  Inpatient    10/7/1955 MRN [id number]   [facility] 3NW-A Attending Daniel Phoenix, *   Hosp Day # 4 PCP Aashish Luevano MD     Subjective:    Patient tolerating regular Abdominal pain, acute     Diarrhea, unspecified type      Impression:     75 y/o with abdominal pain, diarrhea   c diff negative  Stool studies negative  Tolerating regular diet  Soft formed BM today    Plan:    Regular diet  Imodium as needed  No plans fo

## 2021-05-07 NOTE — PROGRESS NOTES
NURSING DISCHARGE NOTE    Discharged Home via ambulatory (refused wheelchair)  Accompanied by RN, Spouse and Support staff  Belongings Taken by patient/family. Verbal and written discharge instructions given to patient with wife at bedside. Verbalize

## 2021-05-07 NOTE — PROGRESS NOTES
[facility] Hospitalist Progress Note     [facility]    c c follow up    SUBJECTIVE:  Sitting up in bed, no watery stool overnight. Had a few soft stools this AM- not watery.  No LH/dizziness    OBJECTIVE:  Temp:  [98 °F (36.7 °C)-98.9 °F (37.2 °C)] 98.3 °F outpatient medications on file.       Loperamide HCl (IMODIUM) cap 4 mg, 4 mg, Oral, BID  Metoprolol Succinate ER (Toprol XL) 24 hr tab 25 mg, 25 mg, Oral, 2x Daily(Beta Blocker)  morphINE sulfate (PF) 2 MG/ML injection 1 mg, 1 mg, Intravenous, Q2H PRN   Or for now- to be resumed 5/9/21, when BMs are more formed.     # BPH  - home flomax     # Hx of DVT  - patient with prior IVC filter which was removed in 2019  - reported history of multisystem organ failure with heparin  - will hold on subQ heparin, this wa

## 2021-05-07 NOTE — PROGRESS NOTES
Pt resting in bed, easy non labored breathing on ra. Vs wnl. Iv fluids infusing without difficulty. Pt tolerating regular diet. Pt states diarrhea has improved. Pt voids adequate amount. Pt up ad laila. Steady gait. Plan of care discussed.  All questions answ

## 2021-05-11 ENCOUNTER — APPOINTMENT (OUTPATIENT)
Dept: CT IMAGING | Facility: HOSPITAL | Age: 66
End: 2021-05-11
Attending: EMERGENCY MEDICINE
Payer: MEDICARE

## 2021-05-11 ENCOUNTER — HOSPITAL ENCOUNTER (OUTPATIENT)
Facility: HOSPITAL | Age: 66
Setting detail: OBSERVATION
Discharge: HOME | End: 2021-05-13
Attending: EMERGENCY MEDICINE | Admitting: INTERNAL MEDICINE
Payer: MEDICARE

## 2021-05-11 DIAGNOSIS — I95.1 ORTHOSTATIC HYPOTENSION: ICD-10-CM

## 2021-05-11 DIAGNOSIS — A08.4 VIRAL ENTERITIS: Primary | ICD-10-CM

## 2021-05-11 PROCEDURE — 96361 HYDRATE IV INFUSION ADD-ON: CPT

## 2021-05-11 PROCEDURE — 99285 EMERGENCY DEPT VISIT HI MDM: CPT

## 2021-05-11 PROCEDURE — 96360 HYDRATION IV INFUSION INIT: CPT

## 2021-05-11 PROCEDURE — 85025 COMPLETE CBC W/AUTO DIFF WBC: CPT | Performed by: EMERGENCY MEDICINE

## 2021-05-11 PROCEDURE — 74176 CT ABD & PELVIS W/O CONTRAST: CPT | Performed by: EMERGENCY MEDICINE

## 2021-05-11 PROCEDURE — 80053 COMPREHEN METABOLIC PANEL: CPT | Performed by: EMERGENCY MEDICINE

## 2021-05-11 PROCEDURE — 83690 ASSAY OF LIPASE: CPT | Performed by: EMERGENCY MEDICINE

## 2021-05-11 RX ORDER — DIPHENOXYLATE HYDROCHLORIDE AND ATROPINE SULFATE 2.5; .025 MG/1; MG/1
1 TABLET ORAL ONCE
Status: COMPLETED | OUTPATIENT
Start: 2021-05-11 | End: 2021-05-11

## 2021-05-11 RX ORDER — SODIUM CHLORIDE 9 MG/ML
INJECTION, SOLUTION INTRAVENOUS CONTINUOUS
Status: DISCONTINUED | OUTPATIENT
Start: 2021-05-11 | End: 2021-05-13

## 2021-05-11 RX ORDER — METOPROLOL SUCCINATE 25 MG/1
25 TABLET, EXTENDED RELEASE ORAL
Status: DISCONTINUED | OUTPATIENT
Start: 2021-05-11 | End: 2021-05-13

## 2021-05-11 RX ORDER — ACETAMINOPHEN 325 MG/1
650 TABLET ORAL EVERY 6 HOURS PRN
Status: DISCONTINUED | OUTPATIENT
Start: 2021-05-11 | End: 2021-05-13

## 2021-05-11 RX ORDER — ASPIRIN 81 MG/1
81 TABLET ORAL DAILY
Status: DISCONTINUED | OUTPATIENT
Start: 2021-05-11 | End: 2021-05-13

## 2021-05-11 RX ORDER — LOPERAMIDE HYDROCHLORIDE 2 MG/1
4 CAPSULE ORAL 4 TIMES DAILY PRN
Status: DISCONTINUED | OUTPATIENT
Start: 2021-05-11 | End: 2021-05-12

## 2021-05-11 RX ORDER — TAMSULOSIN HYDROCHLORIDE 0.4 MG/1
0.8 CAPSULE ORAL EVERY EVENING
Status: DISCONTINUED | OUTPATIENT
Start: 2021-05-11 | End: 2021-05-13

## 2021-05-11 NOTE — ED QUICK NOTES
Pt had multiple episodes of diarrhea. Pt has soiled pants. Pt sitting on bedside commode. Per wife, pt has skin is irritated from the diarrhea. MD aware.

## 2021-05-12 PROCEDURE — 87045 FECES CULTURE AEROBIC BACT: CPT | Performed by: EMERGENCY MEDICINE

## 2021-05-12 PROCEDURE — 85025 COMPLETE CBC W/AUTO DIFF WBC: CPT | Performed by: HOSPITALIST

## 2021-05-12 PROCEDURE — 96365 THER/PROPH/DIAG IV INF INIT: CPT

## 2021-05-12 PROCEDURE — 80048 BASIC METABOLIC PNL TOTAL CA: CPT | Performed by: HOSPITALIST

## 2021-05-12 PROCEDURE — 87427 SHIGA-LIKE TOXIN AG IA: CPT | Performed by: EMERGENCY MEDICINE

## 2021-05-12 PROCEDURE — 87046 STOOL CULTR AEROBIC BACT EA: CPT | Performed by: EMERGENCY MEDICINE

## 2021-05-12 PROCEDURE — 83735 ASSAY OF MAGNESIUM: CPT | Performed by: HOSPITALIST

## 2021-05-12 PROCEDURE — 96361 HYDRATE IV INFUSION ADD-ON: CPT

## 2021-05-12 PROCEDURE — 82272 OCCULT BLD FECES 1-3 TESTS: CPT | Performed by: EMERGENCY MEDICINE

## 2021-05-12 PROCEDURE — 87493 C DIFF AMPLIFIED PROBE: CPT | Performed by: EMERGENCY MEDICINE

## 2021-05-12 RX ORDER — MAGNESIUM SULFATE HEPTAHYDRATE 40 MG/ML
2 INJECTION, SOLUTION INTRAVENOUS ONCE
Status: COMPLETED | OUTPATIENT
Start: 2021-05-12 | End: 2021-05-12

## 2021-05-12 RX ORDER — FLUTICASONE PROPIONATE 50 MCG
1 SPRAY, SUSPENSION (ML) NASAL 2 TIMES DAILY
Status: DISCONTINUED | OUTPATIENT
Start: 2021-05-12 | End: 2021-05-13

## 2021-05-12 RX ORDER — VANCOMYCIN HYDROCHLORIDE 250 MG/1
250 CAPSULE ORAL EVERY 6 HOURS
Status: DISCONTINUED | OUTPATIENT
Start: 2021-05-12 | End: 2021-05-13

## 2021-05-12 NOTE — ED PROVIDER NOTES
Patient Seen in: [facility] 3sw-a      History   Patient presents with:  Abdomen/Flank Pain    Stated Complaint: Abdominal Pain    HPI/Subjective:   HPI    Patient is a 25-year-old male presents emergency room for evaluation of diarrhea.   Patient ha 5/4/16    cystoscopy Dr. Ramon Enrique   • UPPER GI ENDOSCOPY - REFERRAL  8/2015    severe esophagitis, mult superficial duodenal ulcers (bx benign), gastric bx (-) hpylori                Social History    Tobacco Use      Smoking status: Never Smoker      Smokeless Warm and dry with normal appearance. No rashes or lesions. NEUROLOGICAL:  Motor strength intact all groups.   normal sensation, speech intact    ED Course     Labs Reviewed   COMP METABOLIC PANEL (14) - Abnormal; Notable for the following components: SHIGATOXIN   Creatinine 1.31. AST 12. Hematocrit 53.        CT ABDOMEN+PELVIS(CPT=74176)    Result Date: 5/11/2021  PROCEDURE:  CT ABDOMEN+PELVIS (CPT=74176)  COMPARISON:  [facility] , CT, CT ABDOMEN+PELVIS(CONTRAST ONLY)(CPT=74177), 5/03/2021, 8:51 PM.  ED with air-fluid levels without evidence of obstruction. Findings consistent with diarrheal state. 2. Minimal colonic diverticulosis without evidence of diverticulitis.     Dictated by (CST): Chris Bautista MD on 5/11/2021 at 7:45 PM     Finalized by (CST

## 2021-05-12 NOTE — PLAN OF CARE
Alert and oriented,V/S stable,isolation,enteric /contact await stool,lab result specimen sent earlier. IVF infusing,lomotil given as ordered. Plan of care updated,safety measures reinforced,call light kept within reach,instructed to call for help or assist.W

## 2021-05-12 NOTE — CONSULTS
INFECTIOUS DISEASE CONSULTATION    Dennis Duenas Patient Status:  Observation    10/7/1955 MRN [id number]   [facility] 3SW-A Attending Phil Yoder MD   Hosp Day # 0 PCP LOUIE Lozada reports previous alcohol use of about 2.5 standard drinks of alcohol per week. He reports that he does not use drugs. Allergies:    Heparin                     Comment:Other reaction(s):  Other (See Comments)             Bad reaction (retroperitoneal h Pertinent positives and negatives noted in the the HPI. Physical Exam:    General: No acute distress. Alert and oriented x 3.   Vital signs: Temp:  [97 °F (36.1 °C)-99.3 °F (37.4 °C)] 97.9 °F (36.6 °C)  Pulse:  [] 62  Resp:  [16-24] 16  BP: (76-1 Hyperpotassemia     Peritoneal abscess ([facility] [address]     Appendiceal tumor     Anxiety about health     Low grade mucinous neoplasm of appendix     Hypokalemia     Metabolic acidosis     Hyperglycemia     Abdominal pain, acute     Diarrhea, unspecified type     Vi

## 2021-05-12 NOTE — H&P
[facility]    History and Physical     Sonia Duenas Patient Status:  Observation    10/7/1955 MRN [id number]   [facility] 3SW-A Attending Jenelle Finnegan MD   Hosp Day # 0 PCP Lorne Rivas MD     Chief Complaint: Diarrhea    Histor mult superficial duodenal ulcers (bx benign), gastric bx (-) hpylori   Hernia repair  Cervical spine fusion    Social History:  reports that he has never smoked.  He has never used smokeless tobacco. He reports previous alcohol use of about 2.5 standard dri systems was completed. Pertinent positives and negatives noted in the HPI.     Physical Exam:    /56 (BP Location: Left arm)   Pulse 68   Temp 98.3 °F (36.8 °C) (Oral)   Resp 20   Ht 6' 6\" (1.981 m)   Wt 300 lb (136.1 kg)   SpO2 91%   BMI 34.67 kg uncertain   -CT abd/pelvis negative for actepthol  -stool studies pending --> c-diff positive--> po vanco ordered, ID consulted  -GI consulted as pt with stool occult positive and persistent diarrhea    EULALIA  -ivf given --> resolved eulalia    Leukocytosis  -mi

## 2021-05-12 NOTE — ED QUICK NOTES
Pt was offered a diaper by Romy Dempsey PCT, patient declined. Pt and his linens have been changed x 4. Pt states he can not get up to the bedside commode quick enough. Pt wife at bedside.

## 2021-05-13 VITALS
DIASTOLIC BLOOD PRESSURE: 59 MMHG | BODY MASS INDEX: 34.71 KG/M2 | HEIGHT: 78 IN | RESPIRATION RATE: 18 BRPM | WEIGHT: 300 LBS | HEART RATE: 78 BPM | SYSTOLIC BLOOD PRESSURE: 145 MMHG | TEMPERATURE: 98 F | OXYGEN SATURATION: 96 %

## 2021-05-13 PROCEDURE — 96361 HYDRATE IV INFUSION ADD-ON: CPT

## 2021-05-13 PROCEDURE — 80048 BASIC METABOLIC PNL TOTAL CA: CPT | Performed by: HOSPITALIST

## 2021-05-13 PROCEDURE — 83735 ASSAY OF MAGNESIUM: CPT | Performed by: HOSPITALIST

## 2021-05-13 PROCEDURE — 85025 COMPLETE CBC W/AUTO DIFF WBC: CPT | Performed by: HOSPITALIST

## 2021-05-13 RX ORDER — VANCOMYCIN HYDROCHLORIDE 125 MG/1
CAPSULE ORAL
Qty: 70 CAPSULE | Refills: 0 | Status: SHIPPED | OUTPATIENT
Start: 2021-05-13 | End: 2021-06-07

## 2021-05-13 NOTE — PROGRESS NOTES
Pt cleared by all MD's for discharge. Discharge education completed at bedside with pt and spouse, all questions answered. PIV removed, belongings packed. Pt discharging via wheelchair.

## 2021-05-13 NOTE — CONSULTS
[facility]    Report of Gastroenterology Consultation    Sonia Moyerey Patient Status:  Observation    10/7/1955 MRN [id number]   [facility] 3SW-A Attending Jenelle Finnegan MD   Hosp Day # 0 PCP Lorne Rivas MD     Date of Admission COLONOSCOPY,BIOPSY  5/20/2011    [facility]   • CYSTOURETHROSCOPY  5/4/16    cystoscopy Dr. Brynn Cardenas   • UPPER GI ENDOSCOPY - REFERRAL  8/2015    severe esophagitis, mult superficial duodenal ulcers (bx benign), andrei sweats, weight loss, loss of appetite, weight gain, sleep disturbance. Neurological: Denies frequent headaches, history of stroke, recent passing out, recent dizziness, convulsions/seizures, dementia.   Cardiovascular: Denies history of heart murmur, leg s (136.1 kg), SpO2 91 %. General: Appears alert, oriented x3 and in no acute distress. HEENT: Normal. No neck vein distention. Thyroid not enlarged. No lymphadenopathy. CV: S1 and S2 normal.  No murmurs or gallops. Lungs: Clear to auscultation.   Abdom Low grade mucinous neoplasm of appendix     Hypokalemia     Metabolic acidosis     Hyperglycemia     Abdominal pain, acute     Diarrhea, unspecified type     Viral enteritis     Orthostatic hypotension           Cornel Amin  5/12/2021  8:01 PM

## 2021-05-13 NOTE — PLAN OF CARE
Patient A&O X4 on RA. VSS, . Voiding freely, LBM 5/13- diarrhea/positive c diff. On PO Vanco, IVF infusing. Denies pain. Tolerating PO. Up ad laila. Reminded to use call light. Plan for possible dc home tomorrow if improvement.

## 2021-05-13 NOTE — PROGRESS NOTES
[facility]                INFECTIOUS DISEASE PROGRESS NOTE    Kyle Duenas Patient Status:  Observation    10/7/1955 MRN [id number]   [facility] 3SW-A Attending Karli Samson MD   Hosp Day # 0 PCP Natalie Brannon MD     Antibioti --   --    TP 7.2  --   --        Problem list reviewed:  Patient Active Problem List:     BPH (benign prostatic hyperplasia)     Syncope     At risk for falling     Central cord syndrome [facility])     Cervical spine fracture (HCC)     Essential hypertension

## 2021-05-13 NOTE — PLAN OF CARE
Pt alert and oriented x4. VS stable on room air. Pt c/o mild abdominal discomfort. Tolerating diet, denies nausea. IVF infusing per order. Voiding freely, BM x1 early AM. Tolerating PO abx, ID following. Isolation precautions maintained.  Up with steady gai

## 2021-05-14 ENCOUNTER — PATIENT OUTREACH (OUTPATIENT)
Dept: CASE MANAGEMENT | Age: 66
End: 2021-05-14

## 2021-05-14 NOTE — PROGRESS NOTES
1st attempt to contact patient to assist in sched hosp fup: LMTCB to sched appt      MD Elaine Black in 4 week(s) [address]  [address] [facility]           Pasquale Grace MD in 1 week(s) [address] [facility] [facility]

## 2021-05-17 NOTE — PROGRESS NOTES
Wife Susan Ryan ret my call and ask if I can sched both these apptst.    2nd attempt to contact patient to assist in sched hosp fup: LMTCB to sched appt      MD Anabell Alberto in 4 week(s) [address] [facility] [address]  [place], [place]

## 2021-05-17 NOTE — DISCHARGE SUMMARY
[address] [facility] Patient Status:  Observation    10/7/1955 MRN [id number]   [facility] 3SW-A Attending No att. providers found   Hosp Day # 0 PCP Haroon Machado MD     Date of Admission: 2021  Date Take 2 capsules (0.8 mg total) by mouth every evening.    Quantity: 180 capsule  Refills: 2     triamcinolone acetonide 0.1 % Crea  Commonly known as: KENALOG      APPLY TO AFFECTED AREA OF FACE TWICE DAILY FOR 1 TO 2 WEEKS AS NEEDED FLARES   Quantity: 1 abx at this time  -may be secondary to c-diff     BPH  -tamsulosin     HTN  -metoprolol     Plan of care discussed with patient and staff     Dispo: discharge     Roslyn Perez MD  [facility]   [phone]      Time spent:  > 30 minutes

## 2021-11-18 NOTE — PLAN OF CARE
Problem: RESPIRATORY - ADULT  Goal: Achieves optimal ventilation and oxygenation  Description: INTERVENTIONS:  - Assess for changes in respiratory status  - Assess for changes in mentation and behavior  - Position to facilitate oxygenation and minimize r ordered  - Monitor response to electrolyte replacements, including rhythm and repeat lab results as appropriate  - Fluid restriction as ordered  - Instruct patient on fluid and nutrition restrictions as appropriate  Outcome: Adequate for Discharge     Prob Detail Level: Detailed Introduction Text (Please End With A Colon): The following procedure was deferred: Procedure To Be Performed At Next Visit: Excision

## 2021-12-13 ENCOUNTER — APPOINTMENT (OUTPATIENT)
Dept: GENERAL RADIOLOGY | Facility: HOSPITAL | Age: 66
End: 2021-12-13
Attending: EMERGENCY MEDICINE
Payer: MEDICARE

## 2021-12-13 ENCOUNTER — HOSPITAL ENCOUNTER (EMERGENCY)
Facility: HOSPITAL | Age: 66
Discharge: HOME | End: 2021-12-13
Attending: EMERGENCY MEDICINE
Payer: MEDICARE

## 2021-12-13 VITALS
BODY MASS INDEX: 34.13 KG/M2 | RESPIRATION RATE: 17 BRPM | SYSTOLIC BLOOD PRESSURE: 139 MMHG | HEIGHT: 78 IN | DIASTOLIC BLOOD PRESSURE: 86 MMHG | OXYGEN SATURATION: 99 % | HEART RATE: 76 BPM | TEMPERATURE: 98 F | WEIGHT: 295 LBS

## 2021-12-13 DIAGNOSIS — S42.202A CLOSED FRACTURE OF PROXIMAL END OF LEFT HUMERUS, UNSPECIFIED FRACTURE MORPHOLOGY, INITIAL ENCOUNTER: Primary | ICD-10-CM

## 2021-12-13 PROCEDURE — 96374 THER/PROPH/DIAG INJ IV PUSH: CPT

## 2021-12-13 PROCEDURE — 73030 X-RAY EXAM OF SHOULDER: CPT | Performed by: EMERGENCY MEDICINE

## 2021-12-13 PROCEDURE — 99285 EMERGENCY DEPT VISIT HI MDM: CPT

## 2021-12-13 PROCEDURE — 96375 TX/PRO/DX INJ NEW DRUG ADDON: CPT

## 2021-12-13 RX ORDER — HYDROCODONE BITARTRATE AND ACETAMINOPHEN 5; 325 MG/1; MG/1
1-2 TABLET ORAL EVERY 6 HOURS PRN
Qty: 10 TABLET | Refills: 0 | Status: SHIPPED | OUTPATIENT
Start: 2021-12-13 | End: 2021-12-18

## 2021-12-13 RX ORDER — KETOROLAC TROMETHAMINE 30 MG/ML
15 INJECTION, SOLUTION INTRAMUSCULAR; INTRAVENOUS ONCE
Status: COMPLETED | OUTPATIENT
Start: 2021-12-13 | End: 2021-12-13

## 2021-12-13 RX ORDER — MORPHINE SULFATE 4 MG/ML
4 INJECTION, SOLUTION INTRAMUSCULAR; INTRAVENOUS ONCE
Status: COMPLETED | OUTPATIENT
Start: 2021-12-13 | End: 2021-12-13

## 2021-12-13 RX ORDER — ONDANSETRON 2 MG/ML
4 INJECTION INTRAMUSCULAR; INTRAVENOUS ONCE
Status: COMPLETED | OUTPATIENT
Start: 2021-12-13 | End: 2021-12-13

## 2021-12-13 NOTE — ED INITIAL ASSESSMENT (HPI)
Patient relates he tripped and fell at home landing on his left side, now complaining of left shoulder pain with obvious deformity. Denies LOC or other complaint.

## 2021-12-13 NOTE — ED PROVIDER NOTES
Patient Seen in: [facility] Emergency Department      History   Patient presents with:  Fall    Stated Complaint:     Subjective:   HPI    39-year-old with CKD, previous C1 fracture with central cord syndrome, hypertension, high cholesterol presents gastric bx (-) hpylori                Social History    Tobacco Use      Smoking status: Never Smoker      Smokeless tobacco: Never Used    Vaping Use      Vaping Use: Never used    Alcohol use: Not Currently      Alcohol/week: 2.5 standard drinks      Typ thumbs up and okay sign.   Patient is alert and answers questions appropriately    ED Course     Labs Reviewed   RAPID SARS-COV-2 BY PCR - Normal   RAINBOW DRAW LAVENDER   RAINBOW DRAW LIGHT GREEN     Left shoulder: Acute impacted left humeral neck fracture

## 2021-12-13 NOTE — ED QUICK NOTES
contacted and requested to speak with patient regarding concerns about ADL's and mobility while at home.

## 2021-12-14 NOTE — ED QUICK NOTES
Case management advised placement for this patient is pending and they will let contact this RN if an accepting facility is located.

## 2021-12-14 NOTE — ED QUICK NOTES
Sling placed to the left upper extremity and positioned as bed as possible to position of comfort.  An attempt was made to ambulate the patient after the sling was placed however the patient failed to get out of bed as he related he was becoming lightheaded

## 2021-12-15 ENCOUNTER — APPOINTMENT (OUTPATIENT)
Dept: GENERAL RADIOLOGY | Facility: HOSPITAL | Age: 66
End: 2021-12-15
Attending: EMERGENCY MEDICINE
Payer: MEDICARE

## 2021-12-15 ENCOUNTER — APPOINTMENT (OUTPATIENT)
Dept: CT IMAGING | Facility: HOSPITAL | Age: 66
End: 2021-12-15
Attending: EMERGENCY MEDICINE
Payer: MEDICARE

## 2021-12-15 ENCOUNTER — HOSPITAL ENCOUNTER (OUTPATIENT)
Facility: HOSPITAL | Age: 66
Setting detail: OBSERVATION
Discharge: HOME | End: 2021-12-16
Attending: EMERGENCY MEDICINE | Admitting: INTERNAL MEDICINE
Payer: MEDICARE

## 2021-12-15 DIAGNOSIS — R55 SYNCOPE, NEAR: Primary | ICD-10-CM

## 2021-12-15 DIAGNOSIS — R10.9 ABDOMINAL PAIN, ACUTE: ICD-10-CM

## 2021-12-15 DIAGNOSIS — R11.2 NON-INTRACTABLE VOMITING WITH NAUSEA, UNSPECIFIED VOMITING TYPE: ICD-10-CM

## 2021-12-15 PROBLEM — D69.6 THROMBOCYTOPENIA (HCC): Status: ACTIVE | Noted: 2021-12-15

## 2021-12-15 PROCEDURE — 96374 THER/PROPH/DIAG INJ IV PUSH: CPT

## 2021-12-15 PROCEDURE — 96361 HYDRATE IV INFUSION ADD-ON: CPT

## 2021-12-15 PROCEDURE — 93005 ELECTROCARDIOGRAM TRACING: CPT

## 2021-12-15 PROCEDURE — 99285 EMERGENCY DEPT VISIT HI MDM: CPT

## 2021-12-15 PROCEDURE — 83690 ASSAY OF LIPASE: CPT | Performed by: EMERGENCY MEDICINE

## 2021-12-15 PROCEDURE — 70450 CT HEAD/BRAIN W/O DYE: CPT | Performed by: EMERGENCY MEDICINE

## 2021-12-15 PROCEDURE — 85025 COMPLETE CBC W/AUTO DIFF WBC: CPT | Performed by: EMERGENCY MEDICINE

## 2021-12-15 PROCEDURE — 96375 TX/PRO/DX INJ NEW DRUG ADDON: CPT

## 2021-12-15 PROCEDURE — 84484 ASSAY OF TROPONIN QUANT: CPT | Performed by: EMERGENCY MEDICINE

## 2021-12-15 PROCEDURE — 80053 COMPREHEN METABOLIC PANEL: CPT | Performed by: EMERGENCY MEDICINE

## 2021-12-15 PROCEDURE — 93010 ELECTROCARDIOGRAM REPORT: CPT

## 2021-12-15 PROCEDURE — 71045 X-RAY EXAM CHEST 1 VIEW: CPT | Performed by: EMERGENCY MEDICINE

## 2021-12-15 PROCEDURE — 74177 CT ABD & PELVIS W/CONTRAST: CPT | Performed by: EMERGENCY MEDICINE

## 2021-12-15 RX ORDER — SODIUM CHLORIDE 9 MG/ML
INJECTION, SOLUTION INTRAVENOUS CONTINUOUS
Status: DISCONTINUED | OUTPATIENT
Start: 2021-12-15 | End: 2021-12-15

## 2021-12-15 RX ORDER — SODIUM PHOSPHATE, DIBASIC AND SODIUM PHOSPHATE, MONOBASIC 7; 19 G/133ML; G/133ML
1 ENEMA RECTAL ONCE AS NEEDED
Status: DISCONTINUED | OUTPATIENT
Start: 2021-12-15 | End: 2021-12-16

## 2021-12-15 RX ORDER — TAMSULOSIN HYDROCHLORIDE 0.4 MG/1
0.8 CAPSULE ORAL DAILY
COMMUNITY
Start: 2021-10-26 | End: 2021-12-16

## 2021-12-15 RX ORDER — MORPHINE SULFATE 2 MG/ML
1 INJECTION, SOLUTION INTRAMUSCULAR; INTRAVENOUS EVERY 2 HOUR PRN
Status: DISCONTINUED | OUTPATIENT
Start: 2021-12-15 | End: 2021-12-16

## 2021-12-15 RX ORDER — FLUTICASONE PROPIONATE 50 MCG
1 SPRAY, SUSPENSION (ML) NASAL DAILY
COMMUNITY
End: 2022-01-24

## 2021-12-15 RX ORDER — ASPIRIN 81 MG/1
81 TABLET ORAL DAILY
Status: DISCONTINUED | OUTPATIENT
Start: 2021-12-16 | End: 2021-12-16

## 2021-12-15 RX ORDER — SODIUM CHLORIDE 9 MG/ML
1000 INJECTION, SOLUTION INTRAVENOUS ONCE
Status: COMPLETED | OUTPATIENT
Start: 2021-12-15 | End: 2021-12-15

## 2021-12-15 RX ORDER — KETOROLAC TROMETHAMINE 30 MG/ML
15 INJECTION, SOLUTION INTRAMUSCULAR; INTRAVENOUS ONCE
Status: COMPLETED | OUTPATIENT
Start: 2021-12-15 | End: 2021-12-15

## 2021-12-15 RX ORDER — POLYETHYLENE GLYCOL 3350 17 G/17G
17 POWDER, FOR SOLUTION ORAL DAILY PRN
Status: DISCONTINUED | OUTPATIENT
Start: 2021-12-15 | End: 2021-12-16

## 2021-12-15 RX ORDER — MORPHINE SULFATE 2 MG/ML
2 INJECTION, SOLUTION INTRAMUSCULAR; INTRAVENOUS EVERY 2 HOUR PRN
Status: DISCONTINUED | OUTPATIENT
Start: 2021-12-15 | End: 2021-12-16

## 2021-12-15 RX ORDER — MORPHINE SULFATE 4 MG/ML
4 INJECTION, SOLUTION INTRAMUSCULAR; INTRAVENOUS EVERY 2 HOUR PRN
Status: DISCONTINUED | OUTPATIENT
Start: 2021-12-15 | End: 2021-12-16

## 2021-12-15 RX ORDER — HYDROCODONE BITARTRATE AND ACETAMINOPHEN 5; 325 MG/1; MG/1
2 TABLET ORAL EVERY 4 HOURS PRN
Status: DISCONTINUED | OUTPATIENT
Start: 2021-12-15 | End: 2021-12-16

## 2021-12-15 RX ORDER — ONDANSETRON 2 MG/ML
4 INJECTION INTRAMUSCULAR; INTRAVENOUS EVERY 6 HOURS PRN
Status: DISCONTINUED | OUTPATIENT
Start: 2021-12-15 | End: 2021-12-16

## 2021-12-15 RX ORDER — HYDROCODONE BITARTRATE AND ACETAMINOPHEN 5; 325 MG/1; MG/1
1-2 TABLET ORAL EVERY 6 HOURS PRN
Status: DISCONTINUED | OUTPATIENT
Start: 2021-12-15 | End: 2021-12-15

## 2021-12-15 RX ORDER — HEPARIN SODIUM 5000 [USP'U]/ML
5000 INJECTION, SOLUTION INTRAVENOUS; SUBCUTANEOUS EVERY 12 HOURS SCHEDULED
Status: CANCELLED | OUTPATIENT
Start: 2021-12-15

## 2021-12-15 RX ORDER — BISACODYL 10 MG
10 SUPPOSITORY, RECTAL RECTAL
Status: DISCONTINUED | OUTPATIENT
Start: 2021-12-15 | End: 2021-12-16

## 2021-12-15 RX ORDER — ACETAMINOPHEN 325 MG/1
650 TABLET ORAL EVERY 6 HOURS PRN
Status: DISCONTINUED | OUTPATIENT
Start: 2021-12-15 | End: 2021-12-16

## 2021-12-15 RX ORDER — METOPROLOL SUCCINATE 25 MG/1
25 TABLET, EXTENDED RELEASE ORAL 2 TIMES DAILY
COMMUNITY
Start: 2021-11-04

## 2021-12-15 RX ORDER — METOCLOPRAMIDE HYDROCHLORIDE 5 MG/ML
10 INJECTION INTRAMUSCULAR; INTRAVENOUS EVERY 8 HOURS PRN
Status: DISCONTINUED | OUTPATIENT
Start: 2021-12-15 | End: 2021-12-16

## 2021-12-15 RX ORDER — FUROSEMIDE 20 MG/1
20 TABLET ORAL DAILY
Status: ON HOLD | COMMUNITY
End: 2021-12-16

## 2021-12-15 RX ORDER — ONDANSETRON 2 MG/ML
4 INJECTION INTRAMUSCULAR; INTRAVENOUS EVERY 4 HOURS PRN
Status: DISCONTINUED | OUTPATIENT
Start: 2021-12-15 | End: 2021-12-15

## 2021-12-15 RX ORDER — SENNOSIDES 8.6 MG
17.2 TABLET ORAL NIGHTLY PRN
Status: DISCONTINUED | OUTPATIENT
Start: 2021-12-15 | End: 2021-12-16

## 2021-12-15 RX ORDER — ONDANSETRON 2 MG/ML
4 INJECTION INTRAMUSCULAR; INTRAVENOUS ONCE
Status: COMPLETED | OUTPATIENT
Start: 2021-12-15 | End: 2021-12-15

## 2021-12-15 RX ORDER — HYDROCODONE BITARTRATE AND ACETAMINOPHEN 5; 325 MG/1; MG/1
1 TABLET ORAL EVERY 4 HOURS PRN
Status: DISCONTINUED | OUTPATIENT
Start: 2021-12-15 | End: 2021-12-16

## 2021-12-15 RX ORDER — METOPROLOL SUCCINATE 25 MG/1
25 TABLET, EXTENDED RELEASE ORAL
Status: DISCONTINUED | OUTPATIENT
Start: 2021-12-15 | End: 2021-12-16

## 2021-12-15 RX ORDER — SODIUM CHLORIDE 9 MG/ML
INJECTION, SOLUTION INTRAVENOUS CONTINUOUS
Status: DISCONTINUED | OUTPATIENT
Start: 2021-12-15 | End: 2021-12-16

## 2021-12-15 NOTE — H&P
[facility] Hospitalist H&P       CC: Patient presents with:  Abdomen/Flank Pain  Nausea/Vomiting/Diarrhea       PCP: Lupe Rueda MD    History of Present Illness: 78 bph, htn, hx dvt, hx cervical compression fracture/central cord syndrome last dc/ed 5/21 seen h Encounter).         Soc Hx  Social History    Tobacco Use      Smoking status: Never Smoker      Smokeless tobacco: Never Used    Alcohol use: Not Currently      Alcohol/week: 2.5 standard drinks      Types: 3 Cans of beer per week      Comment: occasional hours. Radiology: XR SHOULDER, COMPLETE (MIN 2 VIEWS), LEFT (CPT=73030)    Result Date: 12/13/2021  CONCLUSION:  1. Acute impacted left humeral neck fracture. 2. Arthropathy.    Dictated by (CST): Aubrey Mcneil MD on 12/13/2021 at 4:55 PM     Finalized

## 2021-12-15 NOTE — ED INITIAL ASSESSMENT (HPI)
Pt presents to the ED via EMS from [address] with c/o abd pain and vomiting that started after eating breakfast. Per pt, pt had \"breakfast late today\" and he thinks the \"re-heated sausage\" staff gave him for breakfast caused his current symp

## 2021-12-15 NOTE — ED QUICK NOTES
Orders for admission, patient is aware of plan and ready to go upstairs. Any questions, please call ED ALOK Robles at extension [phone]. Vaccinated?  yes  Type of COVID test sent:rapid  COVID Suspicion level: Low/High  low    Titratable drug(s) infusing:  Ra

## 2021-12-15 NOTE — ED QUICK NOTES
MD at bedside. Pt awake and alert,skin w/d,resps reg/unlabored. Pt in position of comfort on cart, states he is feeling better. Left arm in sling. Pt moving all extremities. Pt aware awaiting bed assignment. Fluids infusing per order.

## 2021-12-15 NOTE — ED PROVIDER NOTES
Patient Seen in: [facility] Emergency Department      History   Patient presents with:  Abdomen/Flank Pain  Nausea/Vomiting/Diarrhea    Stated Complaint: Abd pain    Subjective:   HPI    Patient is a 69-year-old male presents emergency room with a h 9/27/2017    Procedure: COLONOSCOPY, POSSIBLE BIOPSY, POSSIBLE POLYPECTOMY 89509;  Surgeon: Florecita Ferrara MD;  Location: [address] [place] [address]   • COLONOSCOPY N/A 7/10/2020    hyperplastic polyp- repeat 5 yrs (fair prep)   • COLONOSCOPY & POLYPECT air entry bilaterally. HEART: Regular rate and rhythm. Normal S1, S2 no S3, or S4. No murmur. ABDOMEN: There is mild focal tenderness to palpation appreciated along the mid abdomen and right side of the abdomen.  There is no guarding, no rebound, no mass, first-degree AV block no acute ST elevation appreciated. CT BRAIN OR HEAD (61027)    Result Date: 12/15/2021  CONCLUSION:  1. There is chronic small vessel ischemic change noted.   There is no specific evidence of an acute abnormality on the which are unremarkable. Patient's Covid test was found to be negative. Patient's lipase and troponin are found to be negative. Patient placed on a continuous pulse ox and cardiac monitor found to have some mild bradycardia here in the emergency room.   P

## 2021-12-16 ENCOUNTER — APPOINTMENT (OUTPATIENT)
Dept: CV DIAGNOSTICS | Facility: HOSPITAL | Age: 66
End: 2021-12-16
Attending: INTERNAL MEDICINE
Payer: MEDICARE

## 2021-12-16 VITALS
HEART RATE: 67 BPM | RESPIRATION RATE: 16 BRPM | SYSTOLIC BLOOD PRESSURE: 148 MMHG | HEIGHT: 78 IN | WEIGHT: 294 LBS | DIASTOLIC BLOOD PRESSURE: 67 MMHG | OXYGEN SATURATION: 97 % | TEMPERATURE: 98 F | BODY MASS INDEX: 34.02 KG/M2

## 2021-12-16 PROCEDURE — 93306 TTE W/DOPPLER COMPLETE: CPT | Performed by: INTERNAL MEDICINE

## 2021-12-16 PROCEDURE — 85027 COMPLETE CBC AUTOMATED: CPT | Performed by: INTERNAL MEDICINE

## 2021-12-16 PROCEDURE — 97161 PT EVAL LOW COMPLEX 20 MIN: CPT

## 2021-12-16 PROCEDURE — 97530 THERAPEUTIC ACTIVITIES: CPT

## 2021-12-16 PROCEDURE — 80053 COMPREHEN METABOLIC PANEL: CPT | Performed by: INTERNAL MEDICINE

## 2021-12-16 PROCEDURE — 96361 HYDRATE IV INFUSION ADD-ON: CPT

## 2021-12-16 PROCEDURE — 97535 SELF CARE MNGMENT TRAINING: CPT

## 2021-12-16 PROCEDURE — 97116 GAIT TRAINING THERAPY: CPT

## 2021-12-16 PROCEDURE — 97165 OT EVAL LOW COMPLEX 30 MIN: CPT

## 2021-12-16 RX ORDER — FUROSEMIDE 20 MG/1
20 TABLET ORAL EVERY OTHER DAY
Refills: 0 | Status: SHIPPED | COMMUNITY
Start: 2021-12-16 | End: 2022-01-24

## 2021-12-16 RX ORDER — POTASSIUM CHLORIDE 20 MEQ/1
40 TABLET, EXTENDED RELEASE ORAL ONCE
Status: COMPLETED | OUTPATIENT
Start: 2021-12-16 | End: 2021-12-16

## 2021-12-16 NOTE — PLAN OF CARE
Assumed pt care at 0730. A&Ox4. RA, denies chest pain/SOB, lung sounds clear, VSS, NSR on tele. Voids. Up with SBA, needed assistance sitting up. L arm in sling, decreased mobility, POC orthos q shift, IVF, PT eval, echo, possible dc.  POC updated with pt, Problem: PAIN - ADULT  Goal: Verbalizes/displays adequate comfort level or patient's stated pain goal  Description: INTERVENTIONS:  - Encourage pt to monitor pain and request assistance  - Assess pain using appropriate pain scale  - Administer analgesics appropriate  - Identify discharge learning needs (meds, wound care, etc)  - Arrange for interpreters to assist at discharge as needed  - Consider post-discharge preferences of patient/family/discharge partner  - Complete POLST form as appropriate  - Assess

## 2021-12-16 NOTE — CONSULTS
ORTHO CONSULT NOTE    Patient Identification:        Name: Thomas Wiseman  Age: 78year old  Sex: male  :  10/7/1955  MRN: [id number]                                              Requesting Physician: Dr Pradip Tyler      HPI:        Thomas Wiseman is a Date   • Acute renal failure ([facility] [address] 6/5/2019   • BPH (benign prostatic hyperplasia)    • Central cord syndrome [facility])    • Cervical compression fracture (HCC)    • Deep vein thrombosis (HCC)    • High blood pressure    • Malignant essential hypertension 6/5/ tablet (81 mg total) by mouth daily. , Disp: 90 tablet, Rfl: 3, 12/15/2021 at 0900  Acetaminophen 650 MG Oral Tab, Take 1-2 tablets by mouth 2 (two) times daily as needed.   , Disp: , Rfl: , Past Week at Unknown time        Current Meds:   [COMPLETED] Perflu organ failure.   Seasonal                Runny nose    Social History:   Social History    Tobacco Use      Smoking status: Never Smoker      Smokeless tobacco: Never Used    Alcohol use: Not Currently      Alcohol/week: 2.5 standard drinks      Types: 3 Ca the ED. There is a fracture of the proximal humerus. The head is located within the joint. There are no periosteal reactions or medullary lesions seen.        Assessment:     left proximal humerus fracture     Plan:      I have discussed the nature of pro

## 2021-12-16 NOTE — HOME CARE LIAISON
Patient is currently pending with [facility]. A F2F will be needed if patient class changes to inpatient.

## 2021-12-16 NOTE — PLAN OF CARE
Explained discharge instructions including medications and follow-ups to the patient, verbalized understanding, IV removed, tele monitor discontinued, will be transported via wheelchair.    Problem: Patient/Family Goals  Goal: Patient/Family Long Term Goal emergency measures for life threatening arrhythmias  - Monitor electrolytes and administer replacement therapy as ordered  12/16/2021 1620 by Irving Walker RN  Outcome: Completed  12/16/2021 1213 by Irving Walker RN  Outcome: Progressing     Problem: P appropriate  - Consider OT/PT consult to assist with strengthening/mobility  - Encourage toileting schedule  12/16/2021 1620 by Cooper Newton RN  Outcome: Completed  12/16/2021 1213 by Cooper Newton, RN  Outcome: Progressing     Problem: DISCHARGE PLANN

## 2021-12-16 NOTE — CONSULTS
[facility] Cardiology Consultation    [address] [facility] Patient Status:  Observation    10/7/1955 MRN [id number]   [facility] 2NE-A Attending Marta Rolle MD   Hosp Day # 0 PCP Alfredo Last MD     Reason for Consultation:  Syncope      Histo Onset   • Heart Attack Father    • Hypertension Mother    • Diabetes Mother    • Hypertension Brother          Allergies:    Heparin                     Comment:Other reaction(s): Other (See Comments)             Bad reaction (retroperitoneal hemorrhage). 168 hours. Impression:   1. Orthostatic syncope. 2. Minimal CAD on cath 2019, normal EF historically. 3. Cervical neck fracture 2064, complicated by PAF, BL DVT's, ARF, RP bleed post heparin, IVC filter placement. 4. HTN  5. BPH, on flomax  6.  Booker García

## 2021-12-16 NOTE — OCCUPATIONAL THERAPY NOTE
OCCUPATIONAL THERAPY EVALUATION - INPATIENT    Room Number: 2621/2621-A  Evaluation Date: 12/16/2021     Type of Evaluation: Initial  Presenting Problem: syncope    Physician Order: IP Consult to Occupational Therapy  Reason for Therapy:  ADL/IADL Dysfunct Thumb opposition       Education provided: Role of OT, Safety with ADL and transfers, Precautions, DC Rec   Verbalized understanding    Equipment used: elastic laces, dressing DME  Demonstrates functional use    Therapist comments: Pt educated on DME to pt, worn by spouse

## 2021-12-16 NOTE — PROGRESS NOTES
[facility] Hospitalist Progress Note     PCP: Aashish Luevano MD    CC:  Follow up    SUBJECTIVE:  Laying in bed, on RA. Wife at bedside  No LH/dizziness today. Urinating. No BM in a few days.  +flatus    OBJECTIVE:  Temp:  [97.7 °F (36.5 °C)-98.5 °F (36.9 °C)] 9 humeral fx went to ecf now p/w syncope     Syncope: likely vagal/orthostatic w preceding diaphoresis/nausea  ekg w slight st depression lateral leads and v3 which is similar to ekg 2015  -echo 9/2019 ef 55-60  -cath 9/2019 nonobstructive cad  -ct brain neg

## 2021-12-16 NOTE — CM/SW NOTE
12/16/21 1100   CM/SW Referral Data   Referral Source Social Work (self-referral)   Reason for Referral Discharge planning;Readmission   Informant Patient   Patient Info   Patient's Current Mental Status at Time of Assessment Alert;Oriented   Patient's

## 2021-12-16 NOTE — PLAN OF CARE
Assumed care of the patient at [phone] from the ED. Patient alert and oriented x4. Pt from [address]. Adequate saturation on RA with clear lung sounds. C/o of LUE pain only with movement. No N/V/D. Bed alarm is on. Bed at the lowest position.  Call

## 2021-12-16 NOTE — PHYSICAL THERAPY NOTE
PHYSICAL THERAPY EVALUATION - INPATIENT     Room Number: 2621/2621-A  Evaluation Date: 12/16/2021  Type of Evaluation: Initial  Physician Order: PT Eval and Treat    Presenting Problem: Syncope epsisode   Co-Morbidities : thrombocytopenia, BPH, centr device. SUBJECTIVE  \"Can you help me figure out the bathroom grab bar since I cant use my left arm\"      OBJECTIVE  Precautions:  (left arm proximal humeral fracture )         PAIN ASSESSMENT  Ratin  Location: L humerus   Management Techniques:  B supervised for 200 ft     Therapist's comments: Pt received supine in bed with L UE in sling and wife at bedside, pt agreeable to PT. Pt preformed supine to sit supervised, BP taken at EOB and recorded WNL.  Pt was helped with putting on shorts, required mi

## 2021-12-17 NOTE — DISCHARGE SUMMARY
General Medicine Discharge              Admitted 12/15/21  Discharged 12/16/21    Hospital Discharge Diagnoses: vasovagal syncope    Lace+ Score: 54  59-90 High Risk  29-58 Medium Risk  0-28   Low Risk. TCM Follow-Up Recommendation:  LACE 29-58:  Mod Health Assessment Visit with Bill Mart MD  Monday Jan 24, 2022 11:15 AM  For the safety of our patients, visitors and care teams, all patients and visitors are required to wear a mask during their entire visit, only to be removed if asked to do so by you

## 2021-12-29 PROBLEM — I25.10 CORONARY ARTERY DISEASE INVOLVING NATIVE CORONARY ARTERY OF NATIVE HEART WITHOUT ANGINA PECTORIS: Status: ACTIVE | Noted: 2021-12-29

## 2022-01-24 PROBLEM — I82.409 ACUTE EMBOLISM AND THROMBOSIS OF UNSPECIFIED DEEP VEINS OF UNSPECIFIED LOWER EXTREMITY (HCC): Status: ACTIVE | Noted: 2021-12-13

## 2022-01-24 PROBLEM — M19.012 PRIMARY OSTEOARTHRITIS, LEFT SHOULDER: Status: ACTIVE | Noted: 2021-12-13

## 2022-01-24 PROBLEM — E87.5 HYPERPOTASSEMIA: Status: RESOLVED | Noted: 2019-06-05 | Resolved: 2022-01-24

## 2022-01-24 PROBLEM — R53.1 WEAKNESS: Status: ACTIVE | Noted: 2021-12-13

## 2022-01-24 PROBLEM — N18.9 CHRONIC KIDNEY DISEASE, UNSPECIFIED: Status: ACTIVE | Noted: 2021-12-13
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
## 2022-01-24 PROBLEM — E87.6 HYPOKALEMIA: Status: RESOLVED | Noted: 2021-05-03 | Resolved: 2022-01-24

## 2022-01-24 PROBLEM — C18.1 CANCER OF APPENDIX (HCC): Status: ACTIVE | Noted: 2020-02-19

## 2022-01-24 PROBLEM — S42.212D: Status: ACTIVE | Noted: 2021-12-13

## 2022-01-24 PROBLEM — R26.81 UNSTEADINESS ON FEET: Status: ACTIVE | Noted: 2021-12-13

## 2023-04-27 NOTE — PLAN OF CARE
Patient's stool for C-Diff +. Dr. Onelia Horton notified and orders rec'd. ID paged through their office. Patient started on oral vancomycin. Patient updated on plan of care. IV fluid infusing as ordered Mg+ replaced thru electrolyte protocol. Yes

## (undated) DEVICE — KENDALL SCD EXPRESS SLEEVES, KNEE LENGTH, MEDIUM: Brand: KENDALL SCD

## (undated) DEVICE — CHLORAPREP 26ML APPLICATOR

## (undated) DEVICE — SOL  .9 1000ML BTL

## (undated) DEVICE — SUTURE MONOCRYL 4-0 PS-2

## (undated) DEVICE — SUTURE VICRYL 3-0 SH

## (undated) DEVICE — THE ECHELON FLEX POWERED PLUS ARTICULATING ENDOSCOPIC LINEAR CUTTERS ARE STERILE, SINGLE PATIENT USE INSTRUMENTS THAT SIMULTANEOUSLYCUT AND STAPLE TISSUE. THERE ARE SIX STAGGERED ROWS OF STAPLES, THREE ON EITHER SIDE OF THE CUT LINE. THE ECHELON FLEX 45 POWERED PLUSINSTRUMENTS HAVE A STAPLE LINE THAT IS APPROXIMATELY 45 MM LONG AND A CUT LINE THAT IS APPROXIMATELY 42 MM LONG. THE SHAFT CAN ROTATE FREELYIN BOTH DIRECTIONS AND AN ARTICULATION MECHANISM ENABLES THE DISTAL PORTION OF THE SHAFT TO PIVOT TO FACILITATE LATERAL ACCESS TO THE OPERATIVESITE.THE INSTRUMENTS ARE PACKAGED WITH A PRIMARY LITHIUM BATTERY PACK THAT MUST BE INSTALLED PRIOR TO USE. THERE ARE SPECIFIC REQUIREMENTS FORDISPOSING OF THE BATTERY PACK. REFER TO THE BATTERY PACK DISPOSAL SECTION.THE INSTRUMENTS ARE PACKAGED WITHOUT A RELOAD AND MUST BE LOADED PRIOR TO USE. A STAPLE RETAINING CAP ON THE RELOAD PROTECTS THE STAPLE LEGPOINTS DURING SHIPPING AND TRANSPORTATION. THE INSTRUMENTS’ LOCK-OUT FEATURE IS DESIGNED TO PREVENT A USED OR IMPROPERLY INSTALLED RELOADFROM BEING REFIRED OR AN INSTRUMENT FROM BEING FIRED WITHOUT A RELOAD.: Brand: ECHELON FLEX

## (undated) DEVICE — REM POLYHESIVE ADULT PATIENT RETURN ELECTRODE: Brand: VALLEYLAB

## (undated) DEVICE — TROCAR: Brand: KII FIOS FIRST ENTRY

## (undated) DEVICE — TISSUE RETRIEVAL SYSTEM: Brand: INZII RETRIEVAL SYSTEM

## (undated) DEVICE — INSUFFLATION NEEDLE TO ESTABLISH PNEUMOPERITONEUM.: Brand: INSUFFLATION NEEDLE

## (undated) DEVICE — GAMMEX® NON-LATEX PI ORTHO SIZE 8, STERILE POLYISOPRENE POWDER-FREE SURGICAL GLOVE: Brand: GAMMEX

## (undated) DEVICE — CAUTERY PENCIL

## (undated) DEVICE — SYRINGE 20CC LL TIP

## (undated) DEVICE — DERMABOND LIQUID ADHESIVE

## (undated) DEVICE — TROCARS: Brand: KII® BLUNT TIP ACCESS SYSTEM

## (undated) DEVICE — STANDARD HYPODERMIC NEEDLE,POLYPROPYLENE HUB: Brand: MONOJECT

## (undated) DEVICE — DISSECTOR SONICISION CORDLESS

## (undated) DEVICE — THE ECHELON, ECHELON ENDOPATH™ AND ECHELON FLEX™ FAMILIES OF ENDOSCOPIC LINEAR CUTTERS AND RELOADS ARE STERILE, SINGLE PATIENT USE INSTRUMENTS THAT SIMULTANEOUSLY CUT AND STAPLE TISSUE. THERE ARE SIX STAGGERED ROWS OF STAPLES, THREE ON EITHER SIDE OF THE CUT LINE. THE 45 MM INSTRUMENTS HAVE A STAPLE LINE THATIS APPROXIMATELY 45 MM LONG AND A CUT LINE THAT IS APPROXIMATELY 42 MM LONG. THE SHAFT CAN ROTATE FREELY IN BOTH DIRECTIONS AND AN ARTICULATION MECHANISM ON ARTICULATING INSTRUMENTS ENABLES BENDING THE DISTAL PORTIONOF THE SHAFT TO FACILITATE LATERAL ACCESS OF THE OPERATIVE SITE.THE INSTRUMENTS ARE SHIPPED WITHOUT A RELOAD AND MUST BE LOADED PRIOR TO USE. A STAPLE RETAINING CAP ON THE RELOAD PROTECTS THE STAPLE LEG POINTS DURING SHIPPING AND TRANSPORTATION. THE INSTRUMENTS’ LOCK-OUT FEATURE IS DESIGNED TO PREVENT A USED RELOAD FROM BEING REFIRED.: Brand: ECHELON ENDOPATH

## (undated) DEVICE — TROCAR: Brand: KII® SLEEVE

## (undated) DEVICE — VIOLET BRAIDED (POLYGLACTIN 910), SYNTHETIC ABSORBABLE SUTURE: Brand: COATED VICRYL

## (undated) DEVICE — SPONGE: SPECIALTY PEANUT XR 100/CS: Brand: MEDICAL ACTION INDUSTRIES

## (undated) DEVICE — HEX-LOCKING BLADE ELECTRODE: Brand: EDGE

## (undated) DEVICE — LAP CHOLE/APPY CDS-LF: Brand: MEDLINE INDUSTRIES, INC.

## (undated) NOTE — LETTER
[facility] [address], [address]  Consent for Procedure/Sedation    Date:        Time:       1.  I authorize the performance upon Karthikeyan Tapia the following:cardiac catheterization, left ventricular cineangiography, bilateral s period, the physician will determine when the applicable recovery period ends for purposes of reinstating the Do Not Resuscitate (DNR) order.     Signature of Patient: ____________________________________________________    Signature of person authorized